# Patient Record
Sex: MALE | Race: WHITE | NOT HISPANIC OR LATINO | Employment: OTHER | URBAN - METROPOLITAN AREA
[De-identification: names, ages, dates, MRNs, and addresses within clinical notes are randomized per-mention and may not be internally consistent; named-entity substitution may affect disease eponyms.]

---

## 2023-04-30 DIAGNOSIS — I25.10 ATHEROSCLEROTIC HEART DISEASE OF NATIVE CORONARY ARTERY WITHOUT ANGINA PECTORIS: ICD-10-CM

## 2023-05-01 ENCOUNTER — OFFICE VISIT (OUTPATIENT)
Dept: PRIMARY CARE | Facility: CLINIC | Age: 78
End: 2023-05-01
Payer: MEDICARE

## 2023-05-01 VITALS
WEIGHT: 273 LBS | BODY MASS INDEX: 42.85 KG/M2 | DIASTOLIC BLOOD PRESSURE: 70 MMHG | HEIGHT: 67 IN | SYSTOLIC BLOOD PRESSURE: 140 MMHG

## 2023-05-01 DIAGNOSIS — I10 ESSENTIAL (PRIMARY) HYPERTENSION: ICD-10-CM

## 2023-05-01 DIAGNOSIS — G47.33 OBSTRUCTIVE SLEEP APNEA: ICD-10-CM

## 2023-05-01 DIAGNOSIS — E78.2 MIXED HYPERLIPIDEMIA: ICD-10-CM

## 2023-05-01 DIAGNOSIS — I25.10 CORONARY ARTERY DISEASE INVOLVING NATIVE HEART WITHOUT ANGINA PECTORIS, UNSPECIFIED VESSEL OR LESION TYPE: Primary | ICD-10-CM

## 2023-05-01 DIAGNOSIS — E87.1 HYPONATREMIA: ICD-10-CM

## 2023-05-01 DIAGNOSIS — I10 BENIGN ESSENTIAL HYPERTENSION: ICD-10-CM

## 2023-05-01 DIAGNOSIS — Z12.5 SCREENING FOR MALIGNANT NEOPLASM OF PROSTATE: ICD-10-CM

## 2023-05-01 DIAGNOSIS — E66.01 MORBID OBESITY (MULTI): ICD-10-CM

## 2023-05-01 PROBLEM — L82.1 SEBORRHEIC KERATOSIS: Status: ACTIVE | Noted: 2023-05-01

## 2023-05-01 PROBLEM — I65.29 CAROTID STENOSIS: Status: ACTIVE | Noted: 2023-05-01

## 2023-05-01 PROBLEM — M19.90 ARTHRITIS: Status: ACTIVE | Noted: 2023-05-01

## 2023-05-01 PROBLEM — E78.5 HYPERLIPIDEMIA: Status: ACTIVE | Noted: 2023-05-01

## 2023-05-01 PROBLEM — R73.9 ELEVATED BLOOD SUGAR: Status: ACTIVE | Noted: 2023-05-01

## 2023-05-01 PROBLEM — E78.00 PURE HYPERCHOLESTEROLEMIA: Status: ACTIVE | Noted: 2023-05-01

## 2023-05-01 PROBLEM — D12.6 TUBULAR ADENOMA OF COLON: Status: ACTIVE | Noted: 2023-05-01

## 2023-05-01 PROBLEM — R42 DIZZINESS: Status: ACTIVE | Noted: 2023-05-01

## 2023-05-01 PROBLEM — H61.21 HEARING LOSS OF RIGHT EAR DUE TO CERUMEN IMPACTION: Status: ACTIVE | Noted: 2023-05-01

## 2023-05-01 PROBLEM — M25.569 JOINT PAIN, KNEE: Status: ACTIVE | Noted: 2023-05-01

## 2023-05-01 PROBLEM — M25.562 KNEE PAIN, BILATERAL: Status: ACTIVE | Noted: 2023-05-01

## 2023-05-01 PROBLEM — R06.09 DYSPNEA ON EXERTION: Status: ACTIVE | Noted: 2023-05-01

## 2023-05-01 PROBLEM — G45.1 CAROTID ARTERY OCCLUSION SYNDROME: Status: ACTIVE | Noted: 2023-05-01

## 2023-05-01 PROBLEM — I35.1 MODERATE AORTIC REGURGITATION: Status: ACTIVE | Noted: 2023-05-01

## 2023-05-01 PROBLEM — N28.1 RENAL CYST: Status: ACTIVE | Noted: 2023-05-01

## 2023-05-01 PROBLEM — I71.40 AORTIC ANEURYSM, ABDOMINAL (CMS-HCC): Status: ACTIVE | Noted: 2023-05-01

## 2023-05-01 PROBLEM — H02.409 DROOPING EYELID: Status: ACTIVE | Noted: 2023-05-01

## 2023-05-01 PROBLEM — B35.1 NAIL FUNGUS: Status: ACTIVE | Noted: 2023-05-01

## 2023-05-01 PROBLEM — M54.9 BACKACHE: Status: ACTIVE | Noted: 2023-05-01

## 2023-05-01 PROBLEM — I37.1 PULMONARY VALVE REGURGITATION: Status: ACTIVE | Noted: 2023-05-01

## 2023-05-01 PROBLEM — M25.561 KNEE PAIN, BILATERAL: Status: ACTIVE | Noted: 2023-05-01

## 2023-05-01 PROCEDURE — 1159F MED LIST DOCD IN RCRD: CPT | Performed by: INTERNAL MEDICINE

## 2023-05-01 PROCEDURE — 99214 OFFICE O/P EST MOD 30 MIN: CPT | Performed by: INTERNAL MEDICINE

## 2023-05-01 PROCEDURE — 3077F SYST BP >= 140 MM HG: CPT | Performed by: INTERNAL MEDICINE

## 2023-05-01 PROCEDURE — 3078F DIAST BP <80 MM HG: CPT | Performed by: INTERNAL MEDICINE

## 2023-05-01 RX ORDER — LISINOPRIL 20 MG/1
20 TABLET ORAL DAILY
COMMUNITY
Start: 2022-12-13 | End: 2024-03-25 | Stop reason: SDUPTHER

## 2023-05-01 RX ORDER — AMLODIPINE BESYLATE 5 MG/1
1 TABLET ORAL DAILY
COMMUNITY
Start: 2023-02-15 | End: 2024-03-25 | Stop reason: SDUPTHER

## 2023-05-01 RX ORDER — ATORVASTATIN CALCIUM 40 MG/1
TABLET, FILM COATED ORAL
Qty: 90 TABLET | Refills: 0 | Status: SHIPPED | OUTPATIENT
Start: 2023-05-01 | End: 2023-10-09 | Stop reason: ALTCHOICE

## 2023-05-01 RX ORDER — ATORVASTATIN CALCIUM 40 MG/1
40 TABLET, FILM COATED ORAL DAILY
Qty: 90 TABLET | Refills: 1 | Status: SHIPPED | OUTPATIENT
Start: 2023-05-01 | End: 2023-08-02

## 2023-05-01 RX ORDER — ASPIRIN 81 MG/1
81 TABLET ORAL DAILY
COMMUNITY

## 2023-05-01 RX ORDER — METOPROLOL SUCCINATE 100 MG/1
1 TABLET, EXTENDED RELEASE ORAL DAILY
COMMUNITY
Start: 2017-09-13 | End: 2023-10-09 | Stop reason: ALTCHOICE

## 2023-05-01 RX ORDER — ATORVASTATIN CALCIUM 40 MG/1
1 TABLET, FILM COATED ORAL DAILY
COMMUNITY
Start: 2019-06-02 | End: 2023-05-01 | Stop reason: SDUPTHER

## 2023-05-01 NOTE — PROGRESS NOTES
"Subjective   Patient ID: Garrick Hackett is a 77 y.o. male who presents for Follow-up and Med Refill.    HPI   Patient here for follow-up  Did blood work  Needs medication refill  Using CPAP occasionally not very noncompliant  Drinking alcohol every day  Doing the chair volleyball but not much activity  Follow-up on hypertension high cholesterol coronary artery disease  Had colonoscopy found to have tubular adenoma due for colonoscopy in 3 years again  Not able to lose weight at all        past recap  Patient got CPAP machine  He is using every day for 5-6 hours  He only missed using it when he was at [facility] while on his way to [place]  IHe does feel that CPAP is helping his sleep and he is feeling less tired  Follow-up on blood work  Follow-up on blood pressure cholesterol and aneurysm Had a follow-up with cardiologist for his coronary artery disease  He was drinking quite a bit during holiday season  Has gained weight hyponatremia        past recap  Patient is here for follow-up and still not able to lose weight   Second stent not Placed not sure if he needs to continue on Plavix  Patient did the sleep study but did not get the CPAP machine records his insurance did not cover the machine  Follow-up on high cholesterol hypertension elevated blood sugar     Still drinking moderately 6 beers a day  Trying to watch diet  Review of Systems    Objective   /70   Ht 1.702 m (5' 7\")   Wt 124 kg (273 lb)   BMI 42.76 kg/m²     Physical Exam  Vitals reviewed.   Constitutional:       Appearance: Normal appearance. He is obese.   HENT:      Head: Normocephalic and atraumatic.      Right Ear: Tympanic membrane, ear canal and external ear normal.      Left Ear: Tympanic membrane, ear canal and external ear normal.      Nose: Nose normal.      Mouth/Throat:      Pharynx: Oropharynx is clear.   Eyes:      Extraocular Movements: Extraocular movements intact.      Conjunctiva/sclera: Conjunctivae normal.      Pupils: Pupils are " equal, round, and reactive to light.   Cardiovascular:      Rate and Rhythm: Normal rate and regular rhythm.      Pulses: Normal pulses.      Heart sounds: Normal heart sounds.   Pulmonary:      Effort: Pulmonary effort is normal.      Breath sounds: Normal breath sounds.   Abdominal:      General: Abdomen is flat. Bowel sounds are normal.      Palpations: Abdomen is soft.   Musculoskeletal:      Cervical back: Normal range of motion and neck supple.   Skin:     General: Skin is warm and dry.   Neurological:      General: No focal deficit present.      Mental Status: He is alert and oriented to person, place, and time.   Psychiatric:         Mood and Affect: Mood normal.         Assessment/Plan   Problem List Items Addressed This Visit          Nervous    Obstructive sleep apnea       Circulatory    Benign essential hypertension    CAD (coronary artery disease) - Primary    Relevant Medications    amLODIPine (Norvasc) 5 mg tablet    metoprolol succinate XL (Toprol-XL) 100 mg 24 hr tablet    Other Relevant Orders    CBC    Comprehensive Metabolic Panel    Lipid Panel    TSH with reflex to Free T4 if abnormal    Vitamin D 25-Hydroxy,Total       Endocrine/Metabolic    Morbid obesity (CMS/HCC)       Other    Hyperlipidemia    Relevant Medications    atorvastatin (Lipitor) 40 mg tablet    Hyponatremia     Other Visit Diagnoses       Screening for malignant neoplasm of prostate        Relevant Orders    Prostate Spec.Ag,Screen    Essential (primary) hypertension        Relevant Orders    TSH with reflex to Free T4 if abnormal    Body mass index (BMI) 40.0-44.9, adult (CMS/HCC)        Relevant Orders    Vitamin D 25-Hydroxy,Total             past recap  Medicare wellness exam done  Blood pressure stable  Colonoscopy results reviewed  Continue CPAP use for sleep apnea  Blood work results reviewed  Diabetes under control  Cholesterol under control  Blood pressure under control  COPD stable  Follow-up in 6 months  Again  encouraged to quit drinking lose weight exercise  Patient agreed to see the cardiologist and vascular surgeon  Reference given     12/13/22  Repeat blood pressure 130/70  Cholesterol is okay  Again emphasized compliance with CPAP  Follow-up blood work in 3 months as sodium is 130 the lowest he has  Discussed pathophysiology of low sodium and complications  He needs to cut down the drinking  Will also change his lisinopril from 20 mg to 40 mg and discontinue hydrochlorothiazide  Follow-up in 3 months    5/1/2023  Patient has not done blood work  Blood work ordered  Blood pressure stable  Patient had low sodium last time will monitor as patient is still drinking alcohol  Medications refilled  Again encouraged to use CPAP every day  Lose weight  Follow-up after blood work if abnormal otherwise 3 months follow-up

## 2023-08-02 DIAGNOSIS — E78.2 MIXED HYPERLIPIDEMIA: ICD-10-CM

## 2023-08-02 RX ORDER — ATORVASTATIN CALCIUM 40 MG/1
40 TABLET, FILM COATED ORAL DAILY
Qty: 90 TABLET | Refills: 0 | Status: SHIPPED | OUTPATIENT
Start: 2023-08-02 | End: 2024-02-11

## 2023-09-27 PROBLEM — Z79.891 LONG TERM (CURRENT) USE OF OPIATE ANALGESIC: Status: ACTIVE | Noted: 2023-02-13

## 2023-09-27 PROBLEM — I10 HYPERTENSION: Status: ACTIVE | Noted: 2023-09-27

## 2023-09-27 PROBLEM — I10 ESSENTIAL (PRIMARY) HYPERTENSION: Status: ACTIVE | Noted: 2023-02-13

## 2023-09-27 PROBLEM — Z98.61 HISTORY OF PERCUTANEOUS TRANSLUMINAL CORONARY ANGIOPLASTY: Status: ACTIVE | Noted: 2023-09-27

## 2023-09-27 PROBLEM — E66.01 MORBID (SEVERE) OBESITY DUE TO EXCESS CALORIES (MULTI): Status: ACTIVE | Noted: 2023-02-13

## 2023-09-27 PROBLEM — D12.6 COLON ADENOMA: Status: ACTIVE | Noted: 2023-09-27

## 2023-09-27 PROBLEM — Z87.891 PERSONAL HISTORY OF NICOTINE DEPENDENCE: Status: ACTIVE | Noted: 2023-02-13

## 2023-09-27 RX ORDER — LISINOPRIL 40 MG/1
TABLET ORAL DAILY
COMMUNITY
Start: 2023-01-30 | End: 2023-10-09 | Stop reason: ALTCHOICE

## 2023-09-27 RX ORDER — SILDENAFIL 50 MG/1
1 TABLET, FILM COATED ORAL AS NEEDED
COMMUNITY
Start: 2023-08-25

## 2023-09-27 RX ORDER — ACETAMINOPHEN 500 MG
2 TABLET ORAL AS NEEDED
COMMUNITY
Start: 2023-02-16 | End: 2023-10-09 | Stop reason: ALTCHOICE

## 2023-09-27 RX ORDER — METOPROLOL SUCCINATE 100 MG/1
TABLET, EXTENDED RELEASE ORAL DAILY
COMMUNITY
Start: 2023-01-30 | End: 2024-03-25 | Stop reason: SDUPTHER

## 2023-09-27 RX ORDER — TRIAMTERENE AND HYDROCHLOROTHIAZIDE 75; 50 MG/1; MG/1
1 TABLET ORAL DAILY
COMMUNITY
End: 2023-10-09 | Stop reason: ALTCHOICE

## 2023-09-27 RX ORDER — LISINOPRIL AND HYDROCHLOROTHIAZIDE 20; 25 MG/1; MG/1
1 TABLET ORAL DAILY
COMMUNITY
Start: 2022-11-01 | End: 2023-10-09 | Stop reason: ALTCHOICE

## 2023-09-27 RX ORDER — HYDROCODONE BITARTRATE AND ACETAMINOPHEN 5; 325 MG/1; MG/1
TABLET ORAL AS NEEDED
COMMUNITY
Start: 2023-02-14 | End: 2023-10-09 | Stop reason: ALTCHOICE

## 2023-09-27 RX ORDER — FERROUS SULFATE 325(65) MG
1 TABLET ORAL 3 TIMES WEEKLY
COMMUNITY
Start: 2023-02-16

## 2023-09-27 RX ORDER — CALCIUM CARBONATE/VITAMIN D3 600MG-5MCG
1 TABLET ORAL DAILY
COMMUNITY
End: 2023-10-09 | Stop reason: ALTCHOICE

## 2023-09-27 RX ORDER — ACETAMINOPHEN 500 MG
TABLET ORAL
COMMUNITY
End: 2023-10-09 | Stop reason: ALTCHOICE

## 2023-09-27 RX ORDER — SIMVASTATIN 20 MG/1
1 TABLET, FILM COATED ORAL DAILY
COMMUNITY
End: 2024-03-25 | Stop reason: ALTCHOICE

## 2023-09-27 RX ORDER — MULTIVIT-MIN/FA/LYCOPEN/LUTEIN .4-300-25
1 TABLET ORAL DAILY
COMMUNITY
Start: 2023-02-16

## 2023-09-27 RX ORDER — VIT C/E/ZN/COPPR/LUTEIN/ZEAXAN 250MG-90MG
2 CAPSULE ORAL DAILY
COMMUNITY
Start: 2023-02-16 | End: 2023-10-09 | Stop reason: ALTCHOICE

## 2023-09-29 ENCOUNTER — OFFICE VISIT (OUTPATIENT)
Dept: PRIMARY CARE | Facility: CLINIC | Age: 78
End: 2023-09-29
Payer: MEDICARE

## 2023-09-29 VITALS
HEIGHT: 67 IN | BODY MASS INDEX: 42.85 KG/M2 | WEIGHT: 273 LBS | TEMPERATURE: 98 F | SYSTOLIC BLOOD PRESSURE: 126 MMHG | DIASTOLIC BLOOD PRESSURE: 72 MMHG

## 2023-09-29 DIAGNOSIS — J32.9 SINUSITIS, UNSPECIFIED CHRONICITY, UNSPECIFIED LOCATION: Primary | ICD-10-CM

## 2023-09-29 DIAGNOSIS — J40 BRONCHITIS: ICD-10-CM

## 2023-09-29 PROCEDURE — 1036F TOBACCO NON-USER: CPT | Performed by: INTERNAL MEDICINE

## 2023-09-29 PROCEDURE — 1126F AMNT PAIN NOTED NONE PRSNT: CPT | Performed by: INTERNAL MEDICINE

## 2023-09-29 PROCEDURE — 3074F SYST BP LT 130 MM HG: CPT | Performed by: INTERNAL MEDICINE

## 2023-09-29 PROCEDURE — 99213 OFFICE O/P EST LOW 20 MIN: CPT | Performed by: INTERNAL MEDICINE

## 2023-09-29 PROCEDURE — 3078F DIAST BP <80 MM HG: CPT | Performed by: INTERNAL MEDICINE

## 2023-09-29 PROCEDURE — 1159F MED LIST DOCD IN RCRD: CPT | Performed by: INTERNAL MEDICINE

## 2023-09-29 RX ORDER — AMOXICILLIN AND CLAVULANATE POTASSIUM 875; 125 MG/1; MG/1
875 TABLET, FILM COATED ORAL 2 TIMES DAILY
Qty: 20 TABLET | Refills: 0 | Status: SHIPPED | OUTPATIENT
Start: 2023-09-29 | End: 2023-10-09 | Stop reason: ALTCHOICE

## 2023-09-29 RX ORDER — ALBUTEROL SULFATE 90 UG/1
2 AEROSOL, METERED RESPIRATORY (INHALATION) EVERY 4 HOURS PRN
Qty: 8.5 G | Refills: 0 | Status: SHIPPED | OUTPATIENT
Start: 2023-09-29 | End: 2024-09-28

## 2023-09-29 ASSESSMENT — ENCOUNTER SYMPTOMS
OCCASIONAL FEELINGS OF UNSTEADINESS: 0
DEPRESSION: 0
LOSS OF SENSATION IN FEET: 0

## 2023-09-29 NOTE — PROGRESS NOTES
"Subjective   Patient ID: Garrick Hackett is a 78 y.o. male who presents for Cough.    HPI   Patient is here with complaints of having sinus congestion postnasal drainage chest congestion cough for past 1 week.  Having fever  COVID-negative   Does not smoke       patient here for follow-up  Did blood work  Needs medication refill  Using CPAP occasionally not very noncompliant  Drinking alcohol every day  Doing the chair volleyball but not much activity  Follow-up on hypertension high cholesterol coronary artery disease  Had colonoscopy found to have tubular adenoma due for colonoscopy in 3 years again  Not able to lose weight at all     Patient got CPAP machine  He is using every day for 5-6 hours  He only missed using it when he was at [facility] while on his way to [place]  IHe does feel that CPAP is helping his sleep and he is feeling less tired  Follow-up on blood work  Follow-up on blood pressure cholesterol and aneurysm Had a follow-up with cardiologist for his coronary artery disease  He was drinking quite a bit during holiday season  Has gained weight hyponatremia     Patient is here for follow-up and still not able to lose weight   Second stent not Placed not sure if he needs to continue on Plavix  Patient did the sleep study but did not get the CPAP machine records his insurance did not cover the machine  Follow-up on high cholesterol hypertension elevated blood sugar     Still drinking moderately 6 beers a day  Trying to watch diet  Review of Systems    Objective   /72   Ht 1.702 m (5' 7\")   Wt 124 kg (273 lb)   BMI 42.76 kg/m²     Physical Exam  Vitals reviewed.   Constitutional:       Appearance: Normal appearance.   HENT:      Head: Normocephalic and atraumatic.      Right Ear: Tympanic membrane, ear canal and external ear normal.      Left Ear: Tympanic membrane, ear canal and external ear normal.      Nose: Rhinorrhea present. No congestion.      Mouth/Throat:      Pharynx: Oropharynx is clear. "   Eyes:      Extraocular Movements: Extraocular movements intact.      Conjunctiva/sclera: Conjunctivae normal.      Pupils: Pupils are equal, round, and reactive to light.   Cardiovascular:      Rate and Rhythm: Normal rate and regular rhythm.      Pulses: Normal pulses.      Heart sounds: Normal heart sounds.   Pulmonary:      Effort: Pulmonary effort is normal.      Breath sounds: Rhonchi present.   Abdominal:      General: Abdomen is flat. Bowel sounds are normal.      Palpations: Abdomen is soft.   Musculoskeletal:      Cervical back: Normal range of motion and neck supple.   Skin:     General: Skin is warm and dry.   Neurological:      General: No focal deficit present.      Mental Status: He is alert and oriented to person, place, and time.   Psychiatric:         Mood and Affect: Mood normal.         Assessment/Plan   Problem List Items Addressed This Visit    None  Visit Diagnoses         Codes    Sinusitis, unspecified chronicity, unspecified location    -  Primary J32.9    Relevant Medications    amoxicillin-pot clavulanate (Augmentin) 875-125 mg tablet    albuterol (ProAir HFA) 90 mcg/actuation inhaler    Bronchitis     J40    Relevant Medications    amoxicillin-pot clavulanate (Augmentin) 875-125 mg tablet    albuterol (ProAir HFA) 90 mcg/actuation inhaler         past recap  Medicare wellness exam done  Blood pressure stable  Colonoscopy results reviewed  Continue CPAP use for sleep apnea  Blood work results reviewed  Diabetes under control  Cholesterol under control  Blood pressure under control  COPD stable  Follow-up in 6 months  Again encouraged to quit drinking lose weight exercise  Patient agreed to see the cardiologist and vascular surgeon  Reference given     12/13/22  Repeat blood pressure 130/70  Cholesterol is okay  Again emphasized compliance with CPAP  Follow-up blood work in 3 months as sodium is 130 the lowest he has  Discussed pathophysiology of low sodium and complications  He needs to  cut down the drinking  Will also change his lisinopril from 20 mg to 40 mg and discontinue hydrochlorothiazide  Follow-up in 3 months     5/1/2023  Patient has not done blood work  Blood work ordered  Blood pressure stable  Patient had low sodium last time will monitor as patient is still drinking alcohol  Medications refilled  Again encouraged to use CPAP every day  Lose weight  Follow-up after blood work if abnormal otherwise 3 months follow-up     9/29/2023  Temperature 98  Treat with Zithromax and albuterol inhaler  Rest fluids over-the-counter decongestant  Follow-up if not better

## 2023-10-02 ENCOUNTER — APPOINTMENT (OUTPATIENT)
Dept: UROLOGY | Facility: CLINIC | Age: 78
End: 2023-10-02
Payer: MEDICARE

## 2023-10-03 ENCOUNTER — LAB (OUTPATIENT)
Dept: LAB | Facility: LAB | Age: 78
End: 2023-10-03
Payer: MEDICARE

## 2023-10-03 DIAGNOSIS — I25.10 CORONARY ARTERY DISEASE INVOLVING NATIVE HEART WITHOUT ANGINA PECTORIS, UNSPECIFIED VESSEL OR LESION TYPE: ICD-10-CM

## 2023-10-03 DIAGNOSIS — Z12.5 SCREENING FOR MALIGNANT NEOPLASM OF PROSTATE: ICD-10-CM

## 2023-10-03 DIAGNOSIS — I10 ESSENTIAL (PRIMARY) HYPERTENSION: ICD-10-CM

## 2023-10-03 LAB
25(OH)D3 SERPL-MCNC: 22 NG/ML (ref 30–100)
ALBUMIN SERPL BCP-MCNC: 4.1 G/DL (ref 3.4–5)
ALP SERPL-CCNC: 104 U/L (ref 33–136)
ALT SERPL W P-5'-P-CCNC: 18 U/L (ref 10–52)
ANION GAP SERPL CALC-SCNC: 17 MMOL/L (ref 10–20)
AST SERPL W P-5'-P-CCNC: 16 U/L (ref 9–39)
BILIRUB SERPL-MCNC: 1 MG/DL (ref 0–1.2)
BUN SERPL-MCNC: 15 MG/DL (ref 6–23)
CALCIUM SERPL-MCNC: 9.5 MG/DL (ref 8.6–10.6)
CHLORIDE SERPL-SCNC: 100 MMOL/L (ref 98–107)
CHOLEST SERPL-MCNC: 153 MG/DL (ref 0–199)
CHOLESTEROL/HDL RATIO: 2.5
CO2 SERPL-SCNC: 25 MMOL/L (ref 21–32)
CREAT SERPL-MCNC: 0.81 MG/DL (ref 0.5–1.3)
ERYTHROCYTE [DISTWIDTH] IN BLOOD BY AUTOMATED COUNT: 13.1 % (ref 11.5–14.5)
GFR SERPL CREATININE-BSD FRML MDRD: >90 ML/MIN/1.73M*2
GLUCOSE SERPL-MCNC: 104 MG/DL (ref 74–99)
HCT VFR BLD AUTO: 43.7 % (ref 41–52)
HDLC SERPL-MCNC: 61 MG/DL
HGB BLD-MCNC: 14.7 G/DL (ref 13.5–17.5)
LDLC SERPL CALC-MCNC: 67 MG/DL (ref 140–190)
MCH RBC QN AUTO: 31.5 PG (ref 26–34)
MCHC RBC AUTO-ENTMCNC: 33.6 G/DL (ref 32–36)
MCV RBC AUTO: 94 FL (ref 80–100)
NON HDL CHOLESTEROL: 92 MG/DL (ref 0–149)
NRBC BLD-RTO: 0 /100 WBCS (ref 0–0)
PLATELET # BLD AUTO: 199 X10*3/UL (ref 150–450)
PMV BLD AUTO: 9.4 FL (ref 7.5–11.5)
POTASSIUM SERPL-SCNC: 4.7 MMOL/L (ref 3.5–5.3)
PROT SERPL-MCNC: 6.8 G/DL (ref 6.4–8.2)
PSA SERPL-MCNC: 0.75 NG/ML
RBC # BLD AUTO: 4.67 X10*6/UL (ref 4.5–5.9)
SODIUM SERPL-SCNC: 137 MMOL/L (ref 136–145)
TRIGL SERPL-MCNC: 125 MG/DL (ref 0–149)
TSH SERPL-ACNC: 1.34 MIU/L (ref 0.44–3.98)
VLDL: 25 MG/DL (ref 0–40)
WBC # BLD AUTO: 8.2 X10*3/UL (ref 4.4–11.3)

## 2023-10-03 PROCEDURE — 36415 COLL VENOUS BLD VENIPUNCTURE: CPT

## 2023-10-09 ENCOUNTER — OFFICE VISIT (OUTPATIENT)
Dept: PRIMARY CARE | Facility: CLINIC | Age: 78
End: 2023-10-09
Payer: MEDICARE

## 2023-10-09 VITALS
DIASTOLIC BLOOD PRESSURE: 76 MMHG | WEIGHT: 271 LBS | BODY MASS INDEX: 42.53 KG/M2 | SYSTOLIC BLOOD PRESSURE: 146 MMHG | HEIGHT: 67 IN

## 2023-10-09 DIAGNOSIS — I10 BENIGN ESSENTIAL HYPERTENSION: Primary | ICD-10-CM

## 2023-10-09 DIAGNOSIS — R20.2 NUMBNESS AND TINGLING OF BOTH LEGS: ICD-10-CM

## 2023-10-09 DIAGNOSIS — E78.5 DYSLIPIDEMIA: ICD-10-CM

## 2023-10-09 DIAGNOSIS — I25.84 CORONARY ARTERY DISEASE DUE TO CALCIFIED CORONARY LESION: ICD-10-CM

## 2023-10-09 DIAGNOSIS — I25.10 CORONARY ARTERY DISEASE DUE TO CALCIFIED CORONARY LESION: ICD-10-CM

## 2023-10-09 DIAGNOSIS — E66.01 MORBID (SEVERE) OBESITY DUE TO EXCESS CALORIES (MULTI): ICD-10-CM

## 2023-10-09 DIAGNOSIS — R20.0 NUMBNESS AND TINGLING OF BOTH LEGS: ICD-10-CM

## 2023-10-09 PROCEDURE — 1159F MED LIST DOCD IN RCRD: CPT | Performed by: INTERNAL MEDICINE

## 2023-10-09 PROCEDURE — 1126F AMNT PAIN NOTED NONE PRSNT: CPT | Performed by: INTERNAL MEDICINE

## 2023-10-09 PROCEDURE — 99214 OFFICE O/P EST MOD 30 MIN: CPT | Performed by: INTERNAL MEDICINE

## 2023-10-09 PROCEDURE — 1036F TOBACCO NON-USER: CPT | Performed by: INTERNAL MEDICINE

## 2023-10-09 PROCEDURE — 3078F DIAST BP <80 MM HG: CPT | Performed by: INTERNAL MEDICINE

## 2023-10-09 PROCEDURE — 3077F SYST BP >= 140 MM HG: CPT | Performed by: INTERNAL MEDICINE

## 2023-10-09 NOTE — PROGRESS NOTES
"Subjective   Patient ID: Garrick Hackett is a 78 y.o. male who presents for Follow-up.    HPI   Patient is here for follow-up  Complaining of numbness and tingling in both feet  Went to see the cardiologist separation the leg was fine  Not able to make appointment with the vascular yet  Follow-up on hypertension high cholesterol coronary artery disease    patient here for follow-up  Did blood work  Needs medication refill  Using CPAP occasionally not very noncompliant  Drinking alcohol every day  Doing the chair volleyball but not much activity  Follow-up on hypertension high cholesterol coronary artery disease  Had colonoscopy found to have tubular adenoma due for colonoscopy in 3 years again  Not able to lose weight at all     Patient got CPAP machine  He is using every day for 5-6 hours  He only missed using it when he was at [facility] while on his way to [place]  IHe does feel that CPAP is helping his sleep and he is feeling less tired  Follow-up on blood work  Follow-up on blood pressure cholesterol and aneurysm Had a follow-up with cardiologist for his coronary artery disease  He was drinking quite a bit during holiday season  Has gained weight hyponatremia     Patient is here for follow-up and still not able to lose weight   Second stent not Placed not sure if he needs to continue on Plavix  Patient did the sleep study but did not get the CPAP machine records his insurance did not cover the machine  Follow-up on high cholesterol hypertension elevated blood sugar     Still drinking moderately 6 beers a day  Trying to watch diet  Review of Systems    Objective   /76   Ht 1.702 m (5' 7\")   Wt 123 kg (271 lb)   BMI 42.44 kg/m²     Physical Exam  Vitals reviewed.   Constitutional:       Appearance: Normal appearance. He is obese.   HENT:      Head: Normocephalic and atraumatic.      Right Ear: Tympanic membrane, ear canal and external ear normal.      Left Ear: Tympanic membrane, ear canal and external ear " normal.      Nose: Nose normal. No congestion.      Mouth/Throat:      Pharynx: Oropharynx is clear.   Eyes:      Extraocular Movements: Extraocular movements intact.      Conjunctiva/sclera: Conjunctivae normal.      Pupils: Pupils are equal, round, and reactive to light.   Cardiovascular:      Rate and Rhythm: Normal rate and regular rhythm.      Pulses: Normal pulses.      Heart sounds: Normal heart sounds.   Pulmonary:      Effort: Pulmonary effort is normal.      Breath sounds: Normal breath sounds.   Abdominal:      General: Abdomen is flat. Bowel sounds are normal.      Palpations: Abdomen is soft.   Musculoskeletal:      Cervical back: Normal range of motion and neck supple.   Skin:     General: Skin is warm and dry.   Neurological:      General: No focal deficit present.      Mental Status: He is alert and oriented to person, place, and time.   Psychiatric:         Mood and Affect: Mood normal.         Assessment/Plan   Problem List Items Addressed This Visit             ICD-10-CM       Cardiac and Vasculature    Benign essential hypertension - Primary I10    CAD (coronary artery disease) I25.10    Dyslipidemia E78.5       Endocrine/Metabolic    Morbid (severe) obesity due to excess calories (CMS/[facility]) E66.01     Other Visit Diagnoses         Codes    Numbness and tingling of both legs     R20.0, R20.2    Relevant Orders    EMG & nerve conduction         past recap  Medicare wellness exam done  Blood pressure stable  Colonoscopy results reviewed  Continue CPAP use for sleep apnea  Blood work results reviewed  Diabetes under control  Cholesterol under control  Blood pressure under control  COPD stable  Follow-up in 6 months  Again encouraged to quit drinking lose weight exercise  Patient agreed to see the cardiologist and vascular surgeon  Reference given     12/13/22  Repeat blood pressure 130/70  Cholesterol is okay  Again emphasized compliance with CPAP  Follow-up blood work in 3 months as sodium is 130  the lowest he has  Discussed pathophysiology of low sodium and complications  He needs to cut down the drinking  Will also change his lisinopril from 20 mg to 40 mg and discontinue hydrochlorothiazide  Follow-up in 3 months     5/1/2023  Patient has not done blood work  Blood work ordered  Blood pressure stable  Patient had low sodium last time will monitor as patient is still drinking alcohol  Medications refilled  Again encouraged to use CPAP every day  Lose weight  Follow-up after blood work if abnormal otherwise 3 months follow-up     9/29/2023  Temperature 98  Treat with Zithromax and albuterol inhaler  Rest fluids over-the-counter decongestant  Follow-up if not better    10/9/2023  Numbness and tingling in the feet probably not related to vascular  Pulses are palpable  Most likely neuropathy  We will check EMG test on both legs  We will check vitamin D  Blood pressures stable  Blood work reviewed everything in range  PSA is normal   Vitamin D level low take supplements

## 2023-10-10 DIAGNOSIS — I65.23 BILATERAL CAROTID ARTERY STENOSIS: Primary | ICD-10-CM

## 2023-10-10 DIAGNOSIS — Z95.828 H/O ENDOVASCULAR STENT GRAFT FOR ABDOMINAL AORTIC ANEURYSM: ICD-10-CM

## 2023-10-17 ENCOUNTER — ANCILLARY PROCEDURE (OUTPATIENT)
Dept: NEUROLOGY | Facility: CLINIC | Age: 78
End: 2023-10-17
Payer: MEDICARE

## 2023-10-17 DIAGNOSIS — M79.604 PAIN OF RIGHT LOWER EXTREMITY: ICD-10-CM

## 2023-10-17 DIAGNOSIS — R20.0 NUMBNESS: ICD-10-CM

## 2023-10-17 DIAGNOSIS — R20.0 NUMBNESS AND TINGLING OF BOTH LEGS: ICD-10-CM

## 2023-10-17 DIAGNOSIS — R20.2 NUMBNESS AND TINGLING OF BOTH LEGS: ICD-10-CM

## 2023-10-17 DIAGNOSIS — M79.605 PAIN OF LEFT LOWER EXTREMITY: Primary | ICD-10-CM

## 2023-10-17 PROCEDURE — 95886 MUSC TEST DONE W/N TEST COMP: CPT | Performed by: PSYCHIATRY & NEUROLOGY

## 2023-10-17 PROCEDURE — 95912 NRV CNDJ TEST 11-12 STUDIES: CPT | Performed by: PSYCHIATRY & NEUROLOGY

## 2023-10-17 NOTE — PROGRESS NOTES
Nerve conduction studies and needle EMG was performed today on this patient.  Full scanned report is available in the Media tab in Epic.    Impression:  Nerve conduction study and needle examination of the bilateral lower extremities were performed, see details in table. The results were abnormal because of the followin. Absent motor responses. Absent sensory responses. Absent H reflexes. Needle exam showing denervation in distal muscles. 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        Wily Schwarz MD

## 2023-10-25 ENCOUNTER — APPOINTMENT (OUTPATIENT)
Dept: PRIMARY CARE | Facility: CLINIC | Age: 78
End: 2023-10-25
Payer: MEDICARE

## 2023-10-25 ENCOUNTER — OFFICE VISIT (OUTPATIENT)
Dept: PRIMARY CARE | Facility: CLINIC | Age: 78
End: 2023-10-25
Payer: MEDICARE

## 2023-10-25 VITALS
HEIGHT: 67 IN | BODY MASS INDEX: 42.53 KG/M2 | DIASTOLIC BLOOD PRESSURE: 70 MMHG | SYSTOLIC BLOOD PRESSURE: 142 MMHG | WEIGHT: 271 LBS

## 2023-10-25 DIAGNOSIS — I25.10 CORONARY ARTERY DISEASE INVOLVING NATIVE HEART WITHOUT ANGINA PECTORIS, UNSPECIFIED VESSEL OR LESION TYPE: ICD-10-CM

## 2023-10-25 DIAGNOSIS — I10 BENIGN ESSENTIAL HYPERTENSION: ICD-10-CM

## 2023-10-25 DIAGNOSIS — Z01.818 PREOPERATIVE CLEARANCE: ICD-10-CM

## 2023-10-25 DIAGNOSIS — H57.813 PTOSIS OF BOTH EYEBROWS: Primary | ICD-10-CM

## 2023-10-25 PROBLEM — Z95.828 HISTORY OF REPAIR OF ANEURYSM OF ABDOMINAL AORTA USING ENDOVASCULAR STENT GRAFT: Status: ACTIVE | Noted: 2023-10-25

## 2023-10-25 PROCEDURE — 1126F AMNT PAIN NOTED NONE PRSNT: CPT | Performed by: INTERNAL MEDICINE

## 2023-10-25 PROCEDURE — 3078F DIAST BP <80 MM HG: CPT | Performed by: INTERNAL MEDICINE

## 2023-10-25 PROCEDURE — 3077F SYST BP >= 140 MM HG: CPT | Performed by: INTERNAL MEDICINE

## 2023-10-25 PROCEDURE — 93000 ELECTROCARDIOGRAM COMPLETE: CPT | Performed by: INTERNAL MEDICINE

## 2023-10-25 PROCEDURE — 99214 OFFICE O/P EST MOD 30 MIN: CPT | Performed by: INTERNAL MEDICINE

## 2023-10-25 PROCEDURE — 1159F MED LIST DOCD IN RCRD: CPT | Performed by: INTERNAL MEDICINE

## 2023-10-25 PROCEDURE — 1036F TOBACCO NON-USER: CPT | Performed by: INTERNAL MEDICINE

## 2023-10-31 NOTE — PROGRESS NOTES
"Subjective   Patient ID: Garrick Hackett is a 78 y.o. male who presents for presurgical clearance.    HPI   Patient is here for preop clearance.  Going for bilateral blepharoplasty for ptosis in both eyelids  Patient still drinks 4 drinks per day  He does have severe motor or sensory quality neuropathy on the EMG  No chest pain no shortness of breath.  Recent cardiac follow-up stable    Past recap   patient is here for follow-up  Complaining of numbness and tingling in both feet  Went to see the cardiologist separation the leg was fine  Not able to make appointment with the vascular yet  Follow-up on hypertension high cholesterol coronary artery disease    patient here for follow-up  Did blood work  Needs medication refill  Using CPAP occasionally not very noncompliant  Drinking alcohol every day  Doing the chair volleyball but not much activity  Follow-up on hypertension high cholesterol coronary artery disease  Had colonoscopy found to have tubular adenoma due for colonoscopy in 3 years again  Not able to lose weight at all     Patient got CPAP machine  He is using every day for 5-6 hours  He only missed using it when he was at [facility] while on his way to [place]  IHe does feel that CPAP is helping his sleep and he is feeling less tired  Follow-up on blood work  Follow-up on blood pressure cholesterol and aneurysm Had a follow-up with cardiologist for his coronary artery disease  He was drinking quite a bit during holiday season  Has gained weight hyponatremia     Patient is here for follow-up and still not able to lose weight   Second stent not Placed not sure if he needs to continue on Plavix  Patient did the sleep study but did not get the CPAP machine records his insurance did not cover the machine  Follow-up on high cholesterol hypertension elevated blood sugar     Still drinking moderately 6 beers a day  Trying to watch diet  Review of Systems    Objective   /70   Ht 1.702 m (5' 7\")   Wt 123 kg (271 lb) "   BMI 42.44 kg/m²     Physical Exam  Vitals reviewed.   Constitutional:       Appearance: Normal appearance. He is obese.   HENT:      Head: Normocephalic and atraumatic.      Right Ear: Tympanic membrane, ear canal and external ear normal.      Left Ear: Tympanic membrane, ear canal and external ear normal.      Nose: Nose normal. No congestion.      Mouth/Throat:      Pharynx: Oropharynx is clear.   Eyes:      Extraocular Movements: Extraocular movements intact.      Conjunctiva/sclera: Conjunctivae normal.      Pupils: Pupils are equal, round, and reactive to light.   Cardiovascular:      Rate and Rhythm: Normal rate and regular rhythm.      Pulses: Normal pulses.      Heart sounds: Normal heart sounds.   Pulmonary:      Effort: Pulmonary effort is normal.      Breath sounds: Normal breath sounds.   Abdominal:      General: Abdomen is flat. Bowel sounds are normal.      Palpations: Abdomen is soft.   Musculoskeletal:      Cervical back: Normal range of motion and neck supple.   Skin:     General: Skin is warm and dry.   Neurological:      General: No focal deficit present.      Mental Status: He is alert and oriented to person, place, and time.   Psychiatric:         Mood and Affect: Mood normal.         Assessment/Plan   Problem List Items Addressed This Visit             ICD-10-CM       Cardiac and Vasculature    Benign essential hypertension I10    CAD (coronary artery disease) I25.10     Other Visit Diagnoses         Codes    Ptosis of both eyebrows    -  Primary H57.813    Preoperative clearance     Z01.818    Relevant Orders    ECG 12 Lead         past recap  Medicare wellness exam done  Blood pressure stable  Colonoscopy results reviewed  Continue CPAP use for sleep apnea  Blood work results reviewed  Diabetes under control  Cholesterol under control  Blood pressure under control  COPD stable  Follow-up in 6 months  Again encouraged to quit drinking lose weight exercise  Patient agreed to see the  cardiologist and vascular surgeon  Reference given     12/13/22  Repeat blood pressure 130/70  Cholesterol is okay  Again emphasized compliance with CPAP  Follow-up blood work in 3 months as sodium is 130 the lowest he has  Discussed pathophysiology of low sodium and complications  He needs to cut down the drinking  Will also change his lisinopril from 20 mg to 40 mg and discontinue hydrochlorothiazide  Follow-up in 3 months     5/1/2023  Patient has not done blood work  Blood work ordered  Blood pressure stable  Patient had low sodium last time will monitor as patient is still drinking alcohol  Medications refilled  Again encouraged to use CPAP every day  Lose weight  Follow-up after blood work if abnormal otherwise 3 months follow-up         10/9/2023  Numbness and tingling in the feet probably not related to vascular  Pulses are palpable  Most likely neuropathy  We will check EMG test on both legs  We will check vitamin D  Blood pressures stable  Blood work reviewed everything in range  PSA is normal   Vitamin D level low take supplements    10/25/2023  EKG done shows normal sinus rhythm  Blood pressure is stable  Patient clinically stable for low impact surgery  Encourage patient to not drink

## 2023-12-13 ENCOUNTER — OFFICE VISIT (OUTPATIENT)
Dept: PRIMARY CARE | Facility: CLINIC | Age: 78
End: 2023-12-13
Payer: MEDICARE

## 2023-12-13 VITALS
WEIGHT: 272 LBS | BODY MASS INDEX: 42.69 KG/M2 | HEIGHT: 67 IN | SYSTOLIC BLOOD PRESSURE: 164 MMHG | DIASTOLIC BLOOD PRESSURE: 62 MMHG

## 2023-12-13 DIAGNOSIS — R73.9 ELEVATED BLOOD SUGAR: ICD-10-CM

## 2023-12-13 DIAGNOSIS — M54.9 BACK PAIN, UNSPECIFIED BACK LOCATION, UNSPECIFIED BACK PAIN LATERALITY, UNSPECIFIED CHRONICITY: ICD-10-CM

## 2023-12-13 DIAGNOSIS — I10 BENIGN ESSENTIAL HYPERTENSION: ICD-10-CM

## 2023-12-13 DIAGNOSIS — G60.8 PERIPHERAL SENSORY-MOTOR AXONAL POLYNEUROPATHY: ICD-10-CM

## 2023-12-13 DIAGNOSIS — M19.90 ARTHRITIS: ICD-10-CM

## 2023-12-13 DIAGNOSIS — E78.2 MIXED HYPERLIPIDEMIA: ICD-10-CM

## 2023-12-13 PROCEDURE — 3078F DIAST BP <80 MM HG: CPT | Performed by: INTERNAL MEDICINE

## 2023-12-13 PROCEDURE — 99214 OFFICE O/P EST MOD 30 MIN: CPT | Performed by: INTERNAL MEDICINE

## 2023-12-13 PROCEDURE — 3077F SYST BP >= 140 MM HG: CPT | Performed by: INTERNAL MEDICINE

## 2023-12-13 PROCEDURE — 1159F MED LIST DOCD IN RCRD: CPT | Performed by: INTERNAL MEDICINE

## 2023-12-13 PROCEDURE — 1036F TOBACCO NON-USER: CPT | Performed by: INTERNAL MEDICINE

## 2023-12-13 PROCEDURE — 1126F AMNT PAIN NOTED NONE PRSNT: CPT | Performed by: INTERNAL MEDICINE

## 2023-12-13 NOTE — PROGRESS NOTES
Subjective   Patient ID: Garrick Hackett is a 78 y.o. male who presents for Follow-up.    HPI   Patient is here for follow-up  Wants handicap placard  EMG of both legs done  Follow-up on hypertension high cholesterol high blood sugar  Continues to drink alcohol on regular basis     Past recap   patient is here for preop clearance.  Going for bilateral blepharoplasty for ptosis in both eyelids  Patient still drinks 4 drinks per day  He does have severe motor or sensory quality neuropathy on the EMG  No chest pain no shortness of breath.  Recent cardiac follow-up stable     patient is here for follow-up  Complaining of numbness and tingling in both feet  Went to see the cardiologist separation the leg was fine  Not able to make appointment with the vascular yet  Follow-up on hypertension high cholesterol coronary artery disease    patient here for follow-up  Did blood work  Needs medication refill  Using CPAP occasionally not very noncompliant  Drinking alcohol every day  Doing the chair volleyball but not much activity  Follow-up on hypertension high cholesterol coronary artery disease  Had colonoscopy found to have tubular adenoma due for colonoscopy in 3 years again  Not able to lose weight at all     Patient got CPAP machine  He is using every day for 5-6 hours  He only missed using it when he was at [facility] while on his way to [place]  IHe does feel that CPAP is helping his sleep and he is feeling less tired  Follow-up on blood work  Follow-up on blood pressure cholesterol and aneurysm Had a follow-up with cardiologist for his coronary artery disease  He was drinking quite a bit during holiday season  Has gained weight hyponatremia     Patient is here for follow-up and still not able to lose weight   Second stent not Placed not sure if he needs to continue on Plavix  Patient did the sleep study but did not get the CPAP machine records his insurance did not cover the machine  Follow-up on high cholesterol  "hypertension elevated blood sugar     Still drinking moderately 6 beers a day  Trying to watch diet  Review of Systems    Objective   /62   Ht 1.702 m (5' 7\")   Wt 123 kg (272 lb)   BMI 42.60 kg/m²     Physical Exam  Vitals reviewed.   Constitutional:       Appearance: Normal appearance. He is obese.   HENT:      Head: Normocephalic and atraumatic.      Right Ear: Tympanic membrane, ear canal and external ear normal.      Left Ear: Tympanic membrane, ear canal and external ear normal.      Nose: Nose normal. No congestion.      Mouth/Throat:      Pharynx: Oropharynx is clear.   Eyes:      Extraocular Movements: Extraocular movements intact.      Conjunctiva/sclera: Conjunctivae normal.      Pupils: Pupils are equal, round, and reactive to light.   Cardiovascular:      Rate and Rhythm: Normal rate and regular rhythm.      Pulses: Normal pulses.      Heart sounds: Normal heart sounds.   Pulmonary:      Effort: Pulmonary effort is normal.      Breath sounds: Normal breath sounds.   Abdominal:      General: Abdomen is flat. Bowel sounds are normal.      Palpations: Abdomen is soft.   Musculoskeletal:      Cervical back: Normal range of motion and neck supple.   Skin:     General: Skin is warm and dry.   Neurological:      General: No focal deficit present.      Mental Status: He is alert and oriented to person, place, and time.   Psychiatric:         Mood and Affect: Mood normal.         Assessment/Plan   Problem List Items Addressed This Visit             ICD-10-CM       Cardiac and Vasculature    Benign essential hypertension I10    Relevant Orders    CBC    Comprehensive Metabolic Panel    Lipid Panel    Thyroid Stimulating Hormone    Hemoglobin A1C    Vitamin B12    Hyperlipidemia E78.5    Relevant Orders    CBC    Comprehensive Metabolic Panel    Lipid Panel    Thyroid Stimulating Hormone    Hemoglobin A1C    Vitamin B12       Endocrine/Metabolic    Elevated blood sugar R73.9    Relevant Orders    CBC    " Comprehensive Metabolic Panel    Lipid Panel    Thyroid Stimulating Hormone    Hemoglobin A1C    Vitamin B12       Musculoskeletal and Injuries    Arthritis M19.90    Relevant Orders    Disability Placard    Backache M54.9    Relevant Orders    Disability Placard     Other Visit Diagnoses         Codes    Peripheral sensory-motor axonal polyneuropathy     G60.8    Relevant Orders    Disability Placard         past recap  Medicare wellness exam done  Blood pressure stable  Colonoscopy results reviewed  Continue CPAP use for sleep apnea  Blood work results reviewed  Diabetes under control  Cholesterol under control  Blood pressure under control  COPD stable  Follow-up in 6 months  Again encouraged to quit drinking lose weight exercise  Patient agreed to see the cardiologist and vascular surgeon  Reference given     12/13/22  Repeat blood pressure 130/70  Cholesterol is okay  Again emphasized compliance with CPAP  Follow-up blood work in 3 months as sodium is 130 the lowest he has  Discussed pathophysiology of low sodium and complications  He needs to cut down the drinking  Will also change his lisinopril from 20 mg to 40 mg and discontinue hydrochlorothiazide  Follow-up in 3 months     5/1/2023  Patient has not done blood work  Blood work ordered  Blood pressure stable  Patient had low sodium last time will monitor as patient is still drinking alcohol  Medications refilled  Again encouraged to use CPAP every day  Lose weight  Follow-up after blood work if abnormal otherwise 3 months follow-up         10/9/2023  Numbness and tingling in the feet probably not related to vascular  Pulses are palpable  Most likely neuropathy  We will check EMG test on both legs  We will check vitamin D  Blood pressures stable  Blood work reviewed everything in range  PSA is normal   Vitamin D level low take supplements    10/25/2023  EKG done shows normal sinus rhythm  Blood pressure is stable  Patient clinically stable for low impact  surgery  Encourage patient to not drink    12/13/2023  EMG test shows axonal sensory motor see 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  Most likely related to alcohol  Again encourage patient to quit drinking  Take vitamin B12  Handicap sticker given  Blood pressure stable  Cholesterol okay  Due for blood work  Medications refilled  Follow-up in 3 months

## 2024-01-12 ENCOUNTER — OFFICE VISIT (OUTPATIENT)
Dept: CARDIOLOGY | Facility: CLINIC | Age: 79
End: 2024-01-12
Payer: MEDICARE

## 2024-01-12 VITALS
HEART RATE: 96 BPM | SYSTOLIC BLOOD PRESSURE: 122 MMHG | WEIGHT: 271.2 LBS | BODY MASS INDEX: 42.56 KG/M2 | OXYGEN SATURATION: 94 % | HEIGHT: 67 IN | DIASTOLIC BLOOD PRESSURE: 78 MMHG

## 2024-01-12 DIAGNOSIS — I71.40 ABDOMINAL AORTIC ANEURYSM (AAA) WITHOUT RUPTURE, UNSPECIFIED PART (CMS-HCC): Primary | ICD-10-CM

## 2024-01-12 PROCEDURE — 1036F TOBACCO NON-USER: CPT | Performed by: NURSE PRACTITIONER

## 2024-01-12 PROCEDURE — 99214 OFFICE O/P EST MOD 30 MIN: CPT | Performed by: NURSE PRACTITIONER

## 2024-01-12 PROCEDURE — 3074F SYST BP LT 130 MM HG: CPT | Performed by: NURSE PRACTITIONER

## 2024-01-12 PROCEDURE — 1159F MED LIST DOCD IN RCRD: CPT | Performed by: NURSE PRACTITIONER

## 2024-01-12 PROCEDURE — 3078F DIAST BP <80 MM HG: CPT | Performed by: NURSE PRACTITIONER

## 2024-01-12 PROCEDURE — 1126F AMNT PAIN NOTED NONE PRSNT: CPT | Performed by: NURSE PRACTITIONER

## 2024-01-12 ASSESSMENT — ENCOUNTER SYMPTOMS
MUSCULOSKELETAL NEGATIVE: 1
CARDIOVASCULAR NEGATIVE: 1
NEUROLOGICAL NEGATIVE: 1
GASTROINTESTINAL NEGATIVE: 1
CONSTITUTIONAL NEGATIVE: 1
RESPIRATORY NEGATIVE: 1

## 2024-01-12 ASSESSMENT — COLUMBIA-SUICIDE SEVERITY RATING SCALE - C-SSRS
6. HAVE YOU EVER DONE ANYTHING, STARTED TO DO ANYTHING, OR PREPARED TO DO ANYTHING TO END YOUR LIFE?: NO
1. IN THE PAST MONTH, HAVE YOU WISHED YOU WERE DEAD OR WISHED YOU COULD GO TO SLEEP AND NOT WAKE UP?: NO
2. HAVE YOU ACTUALLY HAD ANY THOUGHTS OF KILLING YOURSELF?: NO

## 2024-01-12 ASSESSMENT — PATIENT HEALTH QUESTIONNAIRE - PHQ9
SUM OF ALL RESPONSES TO PHQ9 QUESTIONS 1 AND 2: 0
2. FEELING DOWN, DEPRESSED OR HOPELESS: NOT AT ALL
1. LITTLE INTEREST OR PLEASURE IN DOING THINGS: NOT AT ALL

## 2024-01-12 ASSESSMENT — PAIN SCALES - GENERAL: PAINLEVEL: 0-NO PAIN

## 2024-01-12 ASSESSMENT — LIFESTYLE VARIABLES: HOW OFTEN DO YOU HAVE A DRINK CONTAINING ALCOHOL: 2-3 TIMES A WEEK

## 2024-01-12 NOTE — PROGRESS NOTES
"Chief Complaint:   Follow-up    History Of Present Illness:    .Mr Hackett returns in follow up.  Denies chest pain, sob, palpitations or pedal edema.           Last Recorded Vitals:  Blood pressure 122/78, pulse 96, height 1.702 m (5' 7\"), weight 123 kg (271 lb 3.2 oz), SpO2 94 %.     Past Medical History:  Past Medical History:   Diagnosis Date    Personal history of other endocrine, nutritional and metabolic disease 06/12/2020    History of morbid obesity        Past Surgical History:  Past Surgical History:   Procedure Laterality Date    CT ABDOMEN PELVIS ANGIOGRAM W AND/OR WO IV CONTRAST  1/16/2018    CT ABDOMEN PELVIS ANGIOGRAM W AND/OR WO IV CONTRAST 1/16/2018 [facility]    CT ABDOMEN PELVIS ANGIOGRAM W AND/OR WO IV CONTRAST  6/6/2022    CT ABDOMEN PELVIS ANGIOGRAM W AND/OR WO IV CONTRAST 6/6/2022 [facility]    CT ABDOMEN PELVIS ANGIOGRAM W AND/OR WO IV CONTRAST  10/10/2017    CT ABDOMEN PELVIS ANGIOGRAM W AND/OR WO IV CONTRAST 10/10/2017 [facility]    HERNIA REPAIR  05/17/2013    Hernia Repair    IR ANGIOGRAM AORTA ABDOMEN  11/29/2017    IR ANGIOGRAM AORTA ABDOMEN 11/29/2017 [facility]    KNEE SURGERY  05/17/2013    Knee Surgery Left    KNEE SURGERY  05/17/2013    Knee Surgery Right       Social History:  Social History     Socioeconomic History    Marital status:      Spouse name: None    Number of children: None    Years of education: None    Highest education level: None   Occupational History    None   Tobacco Use    Smoking status: Never    Smokeless tobacco: Never   Substance and Sexual Activity    Alcohol use: Never    Drug use: Never    Sexual activity: None   Other Topics Concern    None   Social History Narrative    None     Social Determinants of Health     Financial Resource Strain: Not on file   Food Insecurity: Not on file   Transportation Needs: Not on file   Physical Activity: Not on file   Stress: Not on file   Social Connections: Not on file "   Intimate Partner Violence: Not on file   Housing Stability: Not on file       Family History:  Family History   Problem Relation Name Age of Onset    Diabetes Mother      Diabetes Father      Liver cancer Brother           Allergies:  Patient has no known allergies.    Outpatient Medications:  Current Outpatient Medications   Medication Sig Dispense Refill    albuterol (ProAir HFA) 90 mcg/actuation inhaler Inhale 2 puffs every 4 hours if needed for wheezing or shortness of breath. 8.5 g 0    amLODIPine (Norvasc) 5 mg tablet Take 1 tablet (5 mg) by mouth once daily.      aspirin 81 mg EC tablet Take 1 tablet (81 mg) by mouth once daily.      atorvastatin (Lipitor) 40 mg tablet Take 1 tablet (40 mg) by mouth once daily. 90 tablet 0    ferrous sulfate (Iron, ferrous sulfate,) 325 (65 Fe) MG tablet Take 1 tablet by mouth 3 times a week.      lisinopril 20 mg tablet Take 1 tablet (20 mg) by mouth once daily.      metoprolol succinate XL (Toprol-XL) 100 mg 24 hr tablet Take by mouth once daily.      multivitamin with minerals iron-free (CertaVite Senior) Take 1 tablet by mouth once daily.      sildenafil (Viagra) 50 mg tablet Take 1 tablet (50 mg) by mouth if needed. Take APPROXIMATELY 1 HOUR BEFORE SEXUAL ACTIVITY      simvastatin (Zocor) 20 mg tablet Take 1 tablet (20 mg) by mouth once daily.       No current facility-administered medications for this visit.        Physical Exam:  Cardiovascular:      PMI at left midclavicular line. Normal rate. Regular rhythm. Normal S1. Normal S2.       Murmurs: There is no murmur.      No gallop.  No click. No rub.   Pulses:     Intact distal pulses.   Edema:     Peripheral edema absent.         ROS:  Review of Systems   Constitutional: Negative.   Cardiovascular: Negative.    Respiratory: Negative.     Musculoskeletal: Negative.    Gastrointestinal: Negative.    Genitourinary: Negative.    Neurological: Negative.           Last Labs:  CBC -  Lab Results   Component Value Date     WBC 8.2 10/03/2023    HGB 14.7 10/03/2023    HCT 43.7 10/03/2023    MCV 94 10/03/2023     10/03/2023       CMP -  Lab Results   Component Value Date    CALCIUM 9.5 10/03/2023    PHOS 4.5 12/16/2017    PROT 6.8 10/03/2023    ALBUMIN 4.1 10/03/2023    AST 16 10/03/2023    ALT 18 10/03/2023    ALKPHOS 104 10/03/2023    BILITOT 1.0 10/03/2023       LIPID PANEL -   Lab Results   Component Value Date    CHOL 153 10/03/2023    TRIG 125 10/03/2023    HDL 61.0 10/03/2023    CHHDL 2.5 10/03/2023    LDLF 49 12/06/2022    VLDL 25 10/03/2023    NHDL 92 10/03/2023       RENAL FUNCTION PANEL -   Lab Results   Component Value Date    GLUCOSE 104 (H) 10/03/2023     10/03/2023    K 4.7 10/03/2023     10/03/2023    CO2 25 10/03/2023    ANIONGAP 17 10/03/2023    BUN 15 10/03/2023    CREATININE 0.81 10/03/2023    GFRMALE 72 12/06/2022    CALCIUM 9.5 10/03/2023    PHOS 4.5 12/16/2017    ALBUMIN 4.1 10/03/2023        Lab Results   Component Value Date    BNP 27 12/14/2017    HGBA1C 5.5 05/18/2021         Assessment/Plan   Problem List Items Addressed This Visit    None      Impressions     Assessment:     1. Coronary artery disease status post PCI and drug-eluting stent deployment to high-grade LAD stenosis 12/14/2017. This patient is a white male who does have multiple risk factors for coronary artery disease including hypertension, hyperlipidemia, type 2 diabetes, and former smoking. The patient recently was identified as having an abdominal aortic aneurysm and as such was scheduled for noninvasive cardiac testing in preparation for presumptive abdominal aortic aneurysm repair. 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 The patient had an echocardiogram performed at [facility] near that time on 05/28/2014 which estimated the LV ejection fraction in the upper 50% range with no regional wall motion  abnormalities along with moderate aortic valve regurgitation and trace to mild tricuspid valve regurgitation. Recently the patient had a echocardiogram on 08/28/2017 with similar results with respect to an estimated ejection fraction in the upper 50% range with no regional wall motion abnormalities. There is again moderate aortic valve insufficiency and severe pulmonic insufficiency. The patient did have a nuclear stress test however on 09/01/2017 which demonstrated again scarring of the anteroseptal wall including the apex consistent with a prior anteroseptal wall MI consistent with the EKG findings even though his 2 echocardiograms failed to demonstrate a wall motion abnormality. The patient presumably had a clinically silent infarct possibly related to his type 2 diabetes. The patient did undergo diagnostic coronary angiography prior to having his aneurysm repaired as part of preoperative clearance. This was performed on 10/10/2017 and it demonstrated a 70% stenosis of the late proximal LAD just before the diagonal branch along with a complete 100% occlusion of the mid portion of the RCA and a 40% stenosis of the LCx. The patient was thought to require PCI but this was delayed to be an elective procedure following the repair of his abdominal aortic aneurysm. The patient subsequently returned on 12/14/2017 and underwent PCI and drug-eluting stent deployment to the LAD with loss of the jailed first diagonal branch. The patient had an echocardiogram performed on 12/15/2017 following that procedure which showed an estimated LV ejection fraction at 45â€“50 percent with segmental regional wall motion abnormalities. This included akinesis of the apical septal segment and hypokinesis of the mid inferoseptal, apical lateral, apical anterior, and apex segments. There was aortic valve sclerosis with mild 1+ aortic valve insufficiency and moderate dilatation of the ascending thoracic aorta. The patient also had a  pharmacological nuclear stress test on 01/29/2018 that showed fixed decreased perfusion involving the distal anteroseptal wall consistent with scar and an LV ejection fraction of 44%. The patient has been feeling great and his resume fairly normal activities including golfing. He is checking his temperature daily because of the corona virus. He will be allowed to discontinue Plavix at this point in time but remain on daily aspirin. Echo done 11/2021 showed EF 55-60%, mild to moderate TR, mild AS and AI, moderate PI, PASP 35 mm Hg. Pharmacologic nuclear stress test done 12/2023 was negative for ischemia.      2. Abdominal aortic aneurysm status post endograft repair 11/29/2017. This patient did have a abdominal ultrasound on 08/18/2017 demonstrating a 5.2-5.3 cm abdominal aortic aneurysm along with dilatation of the bilateral iliac arteries. The patient underwent endograft replacement of the abdominal aortic aneurysm performed on 11/29/2017 at [facility] which was well tolerated. Patient did have a repeat abdominal ultrasound performed on 02/19/2020 which was technically difficult. US done 07/2021 had no significant change.  Repeat US done 09/2023.     3. Moderate aortic valve insufficiency.     4. Hyperlipidemia. Reasonable response to atorvastatin 40 mg daily. Lipid panel on 12/2022 included cholesterol 133 LDL 49 HDL 65 triglyceride 94.      5. Hypertension. The patient will be switched from Dyazide to metoprolol  mg daily.     6. Type 2 diabetes.     7. Former smoking. This patient discontinued smoking following the death of his wife in 2015 from sarcoma.     8. Status post bilateral arthroscopic knee surgery.     9. Status post left inguinal hernia repair Ã--2 in the 1970s     10. Carotid vascular disease. The patient did have a repeat carotid ultrasound on 02/19/2020 showing a 50-69% stenosis right internal carotid artery and less than 50% stenosis of left internal carotid artery. Repeat carotid  US done 01/2022 showed > 70% stenosis to the right and < 50% to the left. Had right carotid endarterectomy with Dr Davies at [place] 02/13/2023. Had an US done 09/2023     11. Obstructive sleep apnea. The patient did have a formal sleep study performed on 10/06/2017.     12. Hx of covid-19 vaccine #1 and #2.     Nay Quezada, JOHANNA-CNP

## 2024-02-10 DIAGNOSIS — E78.2 MIXED HYPERLIPIDEMIA: ICD-10-CM

## 2024-02-11 RX ORDER — ATORVASTATIN CALCIUM 40 MG/1
40 TABLET, FILM COATED ORAL DAILY
Qty: 90 TABLET | Refills: 0 | Status: SHIPPED | OUTPATIENT
Start: 2024-02-11 | End: 2024-03-25 | Stop reason: SDUPTHER

## 2024-02-13 ENCOUNTER — HOSPITAL ENCOUNTER (OUTPATIENT)
Dept: VASCULAR MEDICINE | Facility: CLINIC | Age: 79
Discharge: HOME | End: 2024-02-13
Payer: MEDICARE

## 2024-02-13 DIAGNOSIS — I65.23 BILATERAL CAROTID ARTERY STENOSIS: ICD-10-CM

## 2024-02-13 PROCEDURE — 93880 EXTRACRANIAL BILAT STUDY: CPT

## 2024-02-13 PROCEDURE — 93880 EXTRACRANIAL BILAT STUDY: CPT | Performed by: SURGERY

## 2024-02-14 ENCOUNTER — APPOINTMENT (OUTPATIENT)
Dept: VASCULAR SURGERY | Facility: CLINIC | Age: 79
End: 2024-02-14
Payer: MEDICARE

## 2024-03-18 ENCOUNTER — LAB (OUTPATIENT)
Dept: LAB | Facility: LAB | Age: 79
End: 2024-03-18
Payer: MEDICARE

## 2024-03-18 DIAGNOSIS — R73.9 ELEVATED BLOOD SUGAR: ICD-10-CM

## 2024-03-18 DIAGNOSIS — E78.2 MIXED HYPERLIPIDEMIA: ICD-10-CM

## 2024-03-18 DIAGNOSIS — I10 BENIGN ESSENTIAL HYPERTENSION: ICD-10-CM

## 2024-03-18 LAB
ALBUMIN SERPL BCP-MCNC: 4.3 G/DL (ref 3.4–5)
ALP SERPL-CCNC: 104 U/L (ref 33–136)
ALT SERPL W P-5'-P-CCNC: 15 U/L (ref 10–52)
ANION GAP SERPL CALC-SCNC: 15 MMOL/L (ref 10–20)
AST SERPL W P-5'-P-CCNC: 18 U/L (ref 9–39)
BILIRUB SERPL-MCNC: 1.1 MG/DL (ref 0–1.2)
BUN SERPL-MCNC: 18 MG/DL (ref 6–23)
CALCIUM SERPL-MCNC: 9.5 MG/DL (ref 8.6–10.6)
CHLORIDE SERPL-SCNC: 102 MMOL/L (ref 98–107)
CHOLEST SERPL-MCNC: 162 MG/DL (ref 0–199)
CHOLESTEROL/HDL RATIO: 2.6
CO2 SERPL-SCNC: 27 MMOL/L (ref 21–32)
CREAT SERPL-MCNC: 0.97 MG/DL (ref 0.5–1.3)
EGFRCR SERPLBLD CKD-EPI 2021: 80 ML/MIN/1.73M*2
ERYTHROCYTE [DISTWIDTH] IN BLOOD BY AUTOMATED COUNT: 12 % (ref 11.5–14.5)
EST. AVERAGE GLUCOSE BLD GHB EST-MCNC: 103 MG/DL
GLUCOSE SERPL-MCNC: 98 MG/DL (ref 74–99)
HBA1C MFR BLD: 5.2 %
HCT VFR BLD AUTO: 42.9 % (ref 41–52)
HDLC SERPL-MCNC: 62.2 MG/DL
HGB BLD-MCNC: 14.8 G/DL (ref 13.5–17.5)
LDLC SERPL CALC-MCNC: 82 MG/DL
MCH RBC QN AUTO: 32 PG (ref 26–34)
MCHC RBC AUTO-ENTMCNC: 34.5 G/DL (ref 32–36)
MCV RBC AUTO: 93 FL (ref 80–100)
NON HDL CHOLESTEROL: 100 MG/DL (ref 0–149)
NRBC BLD-RTO: 0 /100 WBCS (ref 0–0)
PLATELET # BLD AUTO: 203 X10*3/UL (ref 150–450)
POTASSIUM SERPL-SCNC: 4.6 MMOL/L (ref 3.5–5.3)
PROT SERPL-MCNC: 6.8 G/DL (ref 6.4–8.2)
RBC # BLD AUTO: 4.62 X10*6/UL (ref 4.5–5.9)
SODIUM SERPL-SCNC: 139 MMOL/L (ref 136–145)
TRIGL SERPL-MCNC: 91 MG/DL (ref 0–149)
TSH SERPL-ACNC: 1.41 MIU/L (ref 0.44–3.98)
VIT B12 SERPL-MCNC: 292 PG/ML (ref 211–911)
VLDL: 18 MG/DL (ref 0–40)
WBC # BLD AUTO: 8.1 X10*3/UL (ref 4.4–11.3)

## 2024-03-18 PROCEDURE — 80061 LIPID PANEL: CPT

## 2024-03-18 PROCEDURE — 83036 HEMOGLOBIN GLYCOSYLATED A1C: CPT

## 2024-03-18 PROCEDURE — 36415 COLL VENOUS BLD VENIPUNCTURE: CPT

## 2024-03-18 PROCEDURE — 80053 COMPREHEN METABOLIC PANEL: CPT

## 2024-03-18 PROCEDURE — 84443 ASSAY THYROID STIM HORMONE: CPT

## 2024-03-18 PROCEDURE — 82607 VITAMIN B-12: CPT

## 2024-03-18 PROCEDURE — 85027 COMPLETE CBC AUTOMATED: CPT

## 2024-03-25 ENCOUNTER — OFFICE VISIT (OUTPATIENT)
Dept: PRIMARY CARE | Facility: CLINIC | Age: 79
End: 2024-03-25
Payer: MEDICARE

## 2024-03-25 VITALS
DIASTOLIC BLOOD PRESSURE: 72 MMHG | HEIGHT: 67 IN | WEIGHT: 266.4 LBS | SYSTOLIC BLOOD PRESSURE: 134 MMHG | BODY MASS INDEX: 41.81 KG/M2

## 2024-03-25 DIAGNOSIS — I71.40 ABDOMINAL AORTIC ANEURYSM (AAA) WITHOUT RUPTURE, UNSPECIFIED PART (CMS-HCC): Primary | ICD-10-CM

## 2024-03-25 DIAGNOSIS — I25.10 CORONARY ARTERY DISEASE INVOLVING NATIVE HEART WITHOUT ANGINA PECTORIS, UNSPECIFIED VESSEL OR LESION TYPE: ICD-10-CM

## 2024-03-25 DIAGNOSIS — I10 PRIMARY HYPERTENSION: ICD-10-CM

## 2024-03-25 DIAGNOSIS — E78.2 MIXED HYPERLIPIDEMIA: ICD-10-CM

## 2024-03-25 DIAGNOSIS — E66.01 MORBID (SEVERE) OBESITY DUE TO EXCESS CALORIES (MULTI): ICD-10-CM

## 2024-03-25 PROCEDURE — 1159F MED LIST DOCD IN RCRD: CPT | Performed by: INTERNAL MEDICINE

## 2024-03-25 PROCEDURE — 3075F SYST BP GE 130 - 139MM HG: CPT | Performed by: INTERNAL MEDICINE

## 2024-03-25 PROCEDURE — 99214 OFFICE O/P EST MOD 30 MIN: CPT | Performed by: INTERNAL MEDICINE

## 2024-03-25 PROCEDURE — 3078F DIAST BP <80 MM HG: CPT | Performed by: INTERNAL MEDICINE

## 2024-03-25 PROCEDURE — 1160F RVW MEDS BY RX/DR IN RCRD: CPT | Performed by: INTERNAL MEDICINE

## 2024-03-25 RX ORDER — LISINOPRIL 20 MG/1
20 TABLET ORAL DAILY
Qty: 90 TABLET | Refills: 0 | Status: SHIPPED | OUTPATIENT
Start: 2024-03-25 | End: 2024-03-25 | Stop reason: SDUPTHER

## 2024-03-25 RX ORDER — METOPROLOL SUCCINATE 100 MG/1
100 TABLET, EXTENDED RELEASE ORAL DAILY
Qty: 90 TABLET | Refills: 0 | Status: SHIPPED | OUTPATIENT
Start: 2024-03-25 | End: 2024-03-25 | Stop reason: SDUPTHER

## 2024-03-25 RX ORDER — ATORVASTATIN CALCIUM 40 MG/1
40 TABLET, FILM COATED ORAL DAILY
Qty: 90 TABLET | Refills: 1 | Status: SHIPPED | OUTPATIENT
Start: 2024-03-25

## 2024-03-25 RX ORDER — AMLODIPINE BESYLATE 5 MG/1
5 TABLET ORAL DAILY
Qty: 90 TABLET | Refills: 0 | Status: SHIPPED | OUTPATIENT
Start: 2024-03-25 | End: 2024-03-25 | Stop reason: SDUPTHER

## 2024-03-25 RX ORDER — AMLODIPINE BESYLATE 5 MG/1
5 TABLET ORAL DAILY
Qty: 90 TABLET | Refills: 1 | Status: SHIPPED | OUTPATIENT
Start: 2024-03-25

## 2024-03-25 RX ORDER — METOPROLOL SUCCINATE 100 MG/1
100 TABLET, EXTENDED RELEASE ORAL DAILY
Qty: 90 TABLET | Refills: 1 | Status: SHIPPED | OUTPATIENT
Start: 2024-03-25

## 2024-03-25 RX ORDER — ATORVASTATIN CALCIUM 40 MG/1
40 TABLET, FILM COATED ORAL DAILY
Qty: 90 TABLET | Refills: 0 | Status: SHIPPED | OUTPATIENT
Start: 2024-03-25 | End: 2024-03-25 | Stop reason: SDUPTHER

## 2024-03-25 RX ORDER — LISINOPRIL 20 MG/1
20 TABLET ORAL DAILY
Qty: 90 TABLET | Refills: 1 | Status: SHIPPED | OUTPATIENT
Start: 2024-03-25

## 2024-03-31 DIAGNOSIS — I65.23 BILATERAL CAROTID ARTERY STENOSIS: Primary | ICD-10-CM

## 2024-04-01 ENCOUNTER — TELEPHONE (OUTPATIENT)
Dept: VASCULAR MEDICINE | Facility: CLINIC | Age: 79
End: 2024-04-01
Payer: MEDICARE

## 2024-04-07 NOTE — PROGRESS NOTES
Subjective   Patient ID: Garrick Hackett is a 78 y.o. male who presents for Follow-up and Med Refill.    HPI   Patient is here for follow-up  Follow-up on hypertension high cholesterol neuropathy and peripheral vascular disease with coronary artery disease  Overall he is stable   Trying to eat better    Patient is here for follow-up  Wants handicap placard  EMG of both legs done  Follow-up on hypertension high cholesterol high blood sugar  Continues to drink alcohol on regular basis     Past recap   patient is here for preop clearance.  Going for bilateral blepharoplasty for ptosis in both eyelids  Patient still drinks 4 drinks per day  He does have severe motor or sensory quality neuropathy on the EMG  No chest pain no shortness of breath.  Recent cardiac follow-up stable     patient is here for follow-up  Complaining of numbness and tingling in both feet  Went to see the cardiologist separation the leg was fine  Not able to make appointment with the vascular yet  Follow-up on hypertension high cholesterol coronary artery disease    patient here for follow-up  Did blood work  Needs medication refill  Using CPAP occasionally not very noncompliant  Drinking alcohol every day  Doing the chair volleyball but not much activity  Follow-up on hypertension high cholesterol coronary artery disease  Had colonoscopy found to have tubular adenoma due for colonoscopy in 3 years again  Not able to lose weight at all     Patient got CPAP machine  He is using every day for 5-6 hours  He only missed using it when he was at [facility] while on his way to [place]  IHe does feel that CPAP is helping his sleep and he is feeling less tired  Follow-up on blood work  Follow-up on blood pressure cholesterol and aneurysm Had a follow-up with cardiologist for his coronary artery disease  He was drinking quite a bit during holiday season  Has gained weight hyponatremia     Patient is here for follow-up and still not able to lose weight   Second  "stent not Placed not sure if he needs to continue on Plavix  Patient did the sleep study but did not get the CPAP machine records his insurance did not cover the machine  Follow-up on high cholesterol hypertension elevated blood sugar     Still drinking moderately 6 beers a day  Trying to watch diet  Review of Systems    Objective   /72   Ht 1.702 m (5' 7\")   Wt 121 kg (266 lb 6.4 oz)   BMI 41.72 kg/m²     Physical Exam  Vitals reviewed.   Constitutional:       Appearance: Normal appearance. He is obese.   HENT:      Head: Normocephalic and atraumatic.      Right Ear: Tympanic membrane, ear canal and external ear normal.      Left Ear: Tympanic membrane, ear canal and external ear normal.      Nose: Nose normal. No congestion.      Mouth/Throat:      Pharynx: Oropharynx is clear.   Eyes:      Extraocular Movements: Extraocular movements intact.      Conjunctiva/sclera: Conjunctivae normal.      Pupils: Pupils are equal, round, and reactive to light.   Cardiovascular:      Rate and Rhythm: Normal rate and regular rhythm.      Pulses: Normal pulses.      Heart sounds: Normal heart sounds.   Pulmonary:      Effort: Pulmonary effort is normal.      Breath sounds: Normal breath sounds.   Abdominal:      General: Abdomen is flat. Bowel sounds are normal.      Palpations: Abdomen is soft.   Musculoskeletal:      Cervical back: Normal range of motion and neck supple.   Skin:     General: Skin is warm and dry.   Neurological:      General: No focal deficit present.      Mental Status: He is alert and oriented to person, place, and time.   Psychiatric:         Mood and Affect: Mood normal.         Assessment/Plan   Problem List Items Addressed This Visit             ICD-10-CM       Cardiac and Vasculature    Aortic aneurysm, abdominal (CMS/HCC) - Primary I71.40    CAD (coronary artery disease) I25.10    Relevant Medications    amLODIPine (Norvasc) 5 mg tablet    metoprolol succinate XL (Toprol-XL) 100 mg 24 hr tablet "    Hyperlipidemia E78.5    Relevant Medications    amLODIPine (Norvasc) 5 mg tablet    atorvastatin (Lipitor) 40 mg tablet    lisinopril 20 mg tablet    metoprolol succinate XL (Toprol-XL) 100 mg 24 hr tablet    Other Relevant Orders    CBC    Comprehensive Metabolic Panel    Lipid Panel    Hypertension I10    Relevant Medications    amLODIPine (Norvasc) 5 mg tablet    lisinopril 20 mg tablet    metoprolol succinate XL (Toprol-XL) 100 mg 24 hr tablet    Other Relevant Orders    CBC    Comprehensive Metabolic Panel    Lipid Panel       Endocrine/Metabolic    Morbid (severe) obesity due to excess calories (CMS/[facility]) E66.01    past recap  Medicare wellness exam done  Blood pressure stable  Colonoscopy results reviewed  Continue CPAP use for sleep apnea  Blood work results reviewed  Diabetes under control  Cholesterol under control  Blood pressure under control  COPD stable  Follow-up in 6 months  Again encouraged to quit drinking lose weight exercise  Patient agreed to see the cardiologist and vascular surgeon  Reference given       10/9/2023  Numbness and tingling in the feet probably not related to vascular  Pulses are palpable  Most likely neuropathy  We will check EMG test on both legs  We will check vitamin D  Blood pressures stable  Blood work reviewed everything in range  PSA is normal   Vitamin D level low take supplements    10/25/2023  EKG done shows normal sinus rhythm  Blood pressure is stable  Patient clinically stable for low impact surgery  Encourage patient to not drink    12/13/2023  EMG test shows axonal sensory motor see 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  Most likely related to alcohol  Again encourage patient to quit drinking  Take vitamin B12  Handicap sticker given  Blood pressure stable  Cholesterol okay  Due for blood work  Medications refilled  Follow-up in 3 months    3/25/2024  Blood work reviewed  Diabetes under control cholesterol under control  Blood pressure under  control  Again encouraged to quit drinking  Encouraged to use CPAP every day  Again encouraged to lose weight  He is under care of cardiologist  He is under care of vascular for AAA  Routine follow-up in 6 months

## 2024-07-12 PROBLEM — J32.9 SINUSITIS: Status: ACTIVE | Noted: 2024-07-12

## 2024-07-12 PROBLEM — R20.2 PARESTHESIA OF LOWER EXTREMITY: Status: ACTIVE | Noted: 2024-07-12

## 2024-07-12 PROBLEM — N52.9 ERECTILE DYSFUNCTION: Status: ACTIVE | Noted: 2024-07-12

## 2024-07-12 PROBLEM — J40 BRONCHITIS: Status: ACTIVE | Noted: 2024-07-12

## 2024-07-12 PROBLEM — H57.813 PTOSIS OF BOTH EYEBROWS: Status: ACTIVE | Noted: 2024-07-12

## 2024-07-15 ENCOUNTER — OFFICE VISIT (OUTPATIENT)
Dept: CARDIOLOGY | Facility: CLINIC | Age: 79
End: 2024-07-15
Payer: MEDICARE

## 2024-07-15 VITALS
BODY MASS INDEX: 42.06 KG/M2 | WEIGHT: 268 LBS | HEIGHT: 67 IN | SYSTOLIC BLOOD PRESSURE: 152 MMHG | DIASTOLIC BLOOD PRESSURE: 79 MMHG | HEART RATE: 80 BPM | OXYGEN SATURATION: 95 %

## 2024-07-15 DIAGNOSIS — R94.31 ABNORMAL ELECTROCARDIOGRAM (ECG) (EKG): ICD-10-CM

## 2024-07-15 DIAGNOSIS — I50.9 CONGESTIVE HEART FAILURE, UNSPECIFIED HF CHRONICITY, UNSPECIFIED HEART FAILURE TYPE (MULTI): Primary | ICD-10-CM

## 2024-07-15 PROCEDURE — 99214 OFFICE O/P EST MOD 30 MIN: CPT | Performed by: NURSE PRACTITIONER

## 2024-07-15 PROCEDURE — 3078F DIAST BP <80 MM HG: CPT | Performed by: NURSE PRACTITIONER

## 2024-07-15 PROCEDURE — 1159F MED LIST DOCD IN RCRD: CPT | Performed by: NURSE PRACTITIONER

## 2024-07-15 PROCEDURE — 1126F AMNT PAIN NOTED NONE PRSNT: CPT | Performed by: NURSE PRACTITIONER

## 2024-07-15 PROCEDURE — 1036F TOBACCO NON-USER: CPT | Performed by: NURSE PRACTITIONER

## 2024-07-15 PROCEDURE — 1160F RVW MEDS BY RX/DR IN RCRD: CPT | Performed by: NURSE PRACTITIONER

## 2024-07-15 PROCEDURE — 3077F SYST BP >= 140 MM HG: CPT | Performed by: NURSE PRACTITIONER

## 2024-07-15 ASSESSMENT — LIFESTYLE VARIABLES
HOW OFTEN DO YOU HAVE SIX OR MORE DRINKS ON ONE OCCASION: NEVER
HOW OFTEN DO YOU HAVE A DRINK CONTAINING ALCOHOL: 4 OR MORE TIMES A WEEK
HOW MANY STANDARD DRINKS CONTAINING ALCOHOL DO YOU HAVE ON A TYPICAL DAY: 3 OR 4
AUDIT-C TOTAL SCORE: 5
SKIP TO QUESTIONS 9-10: 0

## 2024-07-15 ASSESSMENT — ENCOUNTER SYMPTOMS
GASTROINTESTINAL NEGATIVE: 1
RESPIRATORY NEGATIVE: 1
MUSCULOSKELETAL NEGATIVE: 1
DEPRESSION: 0
CARDIOVASCULAR NEGATIVE: 1
LOSS OF SENSATION IN FEET: 1
OCCASIONAL FEELINGS OF UNSTEADINESS: 0
NEUROLOGICAL NEGATIVE: 1
CONSTITUTIONAL NEGATIVE: 1

## 2024-07-15 ASSESSMENT — COLUMBIA-SUICIDE SEVERITY RATING SCALE - C-SSRS
2. HAVE YOU ACTUALLY HAD ANY THOUGHTS OF KILLING YOURSELF?: NO
6. HAVE YOU EVER DONE ANYTHING, STARTED TO DO ANYTHING, OR PREPARED TO DO ANYTHING TO END YOUR LIFE?: NO
1. IN THE PAST MONTH, HAVE YOU WISHED YOU WERE DEAD OR WISHED YOU COULD GO TO SLEEP AND NOT WAKE UP?: NO

## 2024-07-15 ASSESSMENT — PAIN SCALES - GENERAL: PAINLEVEL: 0-NO PAIN

## 2024-07-15 NOTE — PROGRESS NOTES
"Chief Complaint:   Follow-up (6 month)    History Of Present Illness:    .Mr Hackett returns in follow up.  Denies chest pain, sob, palpitations or pedal edema.           Last Recorded Vitals:  Blood pressure 152/79, pulse 80, height 1.702 m (5' 7\"), weight 122 kg (268 lb), SpO2 95%.     Past Medical History:  Past Medical History:   Diagnosis Date    Personal history of other endocrine, nutritional and metabolic disease 06/12/2020    History of morbid obesity        Past Surgical History:  Past Surgical History:   Procedure Laterality Date    CT ABDOMEN PELVIS ANGIOGRAM W AND/OR WO IV CONTRAST  1/16/2018    CT ABDOMEN PELVIS ANGIOGRAM W AND/OR WO IV CONTRAST 1/16/2018 [facility]    CT ABDOMEN PELVIS ANGIOGRAM W AND/OR WO IV CONTRAST  6/6/2022    CT ABDOMEN PELVIS ANGIOGRAM W AND/OR WO IV CONTRAST 6/6/2022 [facility]    CT ABDOMEN PELVIS ANGIOGRAM W AND/OR WO IV CONTRAST  10/10/2017    CT ABDOMEN PELVIS ANGIOGRAM W AND/OR WO IV CONTRAST 10/10/2017 [facility]    HERNIA REPAIR  05/17/2013    Hernia Repair    IR ANGIOGRAM AORTA ABDOMEN  11/29/2017    IR ANGIOGRAM AORTA ABDOMEN 11/29/2017 [facility]GAC[facility]    KNEE SURGERY  05/17/2013    Knee Surgery Left    KNEE SURGERY  05/17/2013    Knee Surgery Right       Social History:  Social History     Socioeconomic History    Marital status:    Tobacco Use    Smoking status: Never    Smokeless tobacco: Never   Substance and Sexual Activity    Alcohol use: Never    Drug use: Never       Family History:  Family History   Problem Relation Name Age of Onset    Diabetes Mother      Diabetes Father      Liver cancer Brother           Allergies:  Patient has no known allergies.    Outpatient Medications:  Current Outpatient Medications   Medication Sig Dispense Refill    amLODIPine (Norvasc) 5 mg tablet Take 1 tablet (5 mg) by mouth once daily. 90 tablet 1    aspirin 81 mg EC tablet Take 1 tablet (81 mg) by mouth once daily.      atorvastatin " (Lipitor) 40 mg tablet Take 1 tablet (40 mg) by mouth once daily. 90 tablet 1    ferrous sulfate (Iron, ferrous sulfate,) 325 (65 Fe) MG tablet Take 1 tablet by mouth 3 times a week.      lisinopril 20 mg tablet Take 1 tablet (20 mg) by mouth once daily. 90 tablet 1    metoprolol succinate XL (Toprol-XL) 100 mg 24 hr tablet Take 1 tablet (100 mg) by mouth once daily. 90 tablet 1    multivitamin with minerals iron-free (CertaVite Senior) Take 1 tablet by mouth once daily.      sildenafil (Viagra) 50 mg tablet Take 1 tablet (50 mg) by mouth if needed. Take APPROXIMATELY 1 HOUR BEFORE SEXUAL ACTIVITY      albuterol (ProAir HFA) 90 mcg/actuation inhaler Inhale 2 puffs every 4 hours if needed for wheezing or shortness of breath. (Patient not taking: Reported on 7/15/2024) 8.5 g 0     No current facility-administered medications for this visit.        Physical Exam:  Cardiovascular:      PMI at left midclavicular line. Normal rate. Regular rhythm. Normal S1. Normal S2.       Murmurs: There is no murmur.      No gallop.  No click. No rub.   Pulses:     Intact distal pulses.   Edema:     Peripheral edema absent.         ROS:  Review of Systems   Constitutional: Negative.   Cardiovascular: Negative.    Respiratory: Negative.     Skin: Negative.    Musculoskeletal: Negative.    Gastrointestinal: Negative.    Genitourinary: Negative.    Neurological: Negative.           Last Labs:  CBC -  Lab Results   Component Value Date    WBC 8.1 03/18/2024    HGB 14.8 03/18/2024    HCT 42.9 03/18/2024    MCV 93 03/18/2024     03/18/2024       CMP -  Lab Results   Component Value Date    CALCIUM 9.5 03/18/2024    PHOS 4.5 12/16/2017    PROT 6.8 03/18/2024    ALBUMIN 4.3 03/18/2024    AST 18 03/18/2024    ALT 15 03/18/2024    ALKPHOS 104 03/18/2024    BILITOT 1.1 03/18/2024       LIPID PANEL -   Lab Results   Component Value Date    CHOL 162 03/18/2024    TRIG 91 03/18/2024    HDL 62.2 03/18/2024    CHHDL 2.6 03/18/2024    LDLF 49  12/06/2022    VLDL 18 03/18/2024    NHDL 100 03/18/2024       RENAL FUNCTION PANEL -   Lab Results   Component Value Date    GLUCOSE 98 03/18/2024     03/18/2024    K 4.6 03/18/2024     03/18/2024    CO2 27 03/18/2024    ANIONGAP 15 03/18/2024    BUN 18 03/18/2024    CREATININE 0.97 03/18/2024    GFRMALE 72 12/06/2022    CALCIUM 9.5 03/18/2024    PHOS 4.5 12/16/2017    ALBUMIN 4.3 03/18/2024        Lab Results   Component Value Date    BNP 27 12/14/2017    HGBA1C 5.2 03/18/2024         Assessment/Plan   Problem List Items Addressed This Visit    None       Assessment:     1. Coronary artery disease status post PCI and drug-eluting stent deployment to high-grade LAD stenosis 12/14/2017. This patient is a white male who does have multiple risk factors for coronary artery disease including hypertension, hyperlipidemia, type 2 diabetes, and former smoking. The patient recently was identified as having an abdominal aortic aneurysm and as such was scheduled for noninvasive cardiac testing in preparation for presumptive abdominal aortic aneurysm repair. 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 The patient had an echocardiogram performed at [facility], [place], operated by [facility] near that time on 05/28/2014 which estimated the LV ejection fraction in the upper 50% range with no regional wall motion abnormalities along with moderate aortic valve regurgitation and trace to mild tricuspid valve regurgitation. Recently the patient had a echocardiogram on 08/28/2017 with similar results with respect to an estimated ejection fraction in the upper 50% range with no regional wall motion abnormalities. There is again moderate aortic valve insufficiency and severe pulmonic insufficiency. The patient did have a nuclear stress test however on 09/01/2017 which demonstrated again scarring of the anteroseptal wall including the apex  consistent with a prior anteroseptal wall MI consistent with the EKG findings even though his 2 echocardiograms failed to demonstrate a wall motion abnormality. The patient presumably had a clinically silent infarct possibly related to his type 2 diabetes. The patient did undergo diagnostic coronary angiography prior to having his aneurysm repaired as part of preoperative clearance. This was performed on 10/10/2017 and it demonstrated a 70% stenosis of the late proximal LAD just before the diagonal branch along with a complete 100% occlusion of the mid portion of the RCA and a 40% stenosis of the LCx. The patient was thought to require PCI but this was delayed to be an elective procedure following the repair of his abdominal aortic aneurysm. The patient subsequently returned on 12/14/2017 and underwent PCI and drug-eluting stent deployment to the LAD with loss of the jailed first diagonal branch. The patient had an echocardiogram performed on 12/15/2017 following that procedure which showed an estimated LV ejection fraction at 45â€“50 percent with segmental regional wall motion abnormalities. This included akinesis of the apical septal segment and hypokinesis of the mid inferoseptal, apical lateral, apical anterior, and apex segments. There was aortic valve sclerosis with mild 1+ aortic valve insufficiency and moderate dilatation of the ascending thoracic aorta. The patient also had a pharmacological nuclear stress test on 01/29/2018 that showed fixed decreased perfusion involving the distal anteroseptal wall consistent with scar and an LV ejection fraction of 44%. The patient has been feeling great and his resume fairly normal activities including golfing. He is checking his temperature daily because of the corona virus. He will be allowed to discontinue Plavix at this point in time but remain on daily aspirin. Echo done 11/2021 showed EF 55-60%, mild to moderate TR, mild AS and AI, moderate PI, PASP 35 mm Hg.  Pharmacologic nuclear stress test done 12/2023 was negative for ischemia.      2. Abdominal aortic aneurysm status post endograft repair 11/29/2017. This patient did have a abdominal ultrasound on 08/18/2017 demonstrating a 5.2-5.3 cm abdominal aortic aneurysm along with dilatation of the bilateral iliac arteries. The patient underwent endograft replacement of the abdominal aortic aneurysm performed on 11/29/2017 at [facility] which was well tolerated. Patient did have a repeat abdominal ultrasound performed on 02/19/2020 which was technically difficult. US done 07/2021 had no significant change.  Repeat US done 09/2023.     3. Moderate aortic valve insufficiency.     4. Hyperlipidemia. Reasonable response to atorvastatin 40 mg daily. Lipid panel on 12/2022 included cholesterol 133 LDL 49 HDL 65 triglyceride 94.      5. Hypertension. The patient will be switched from Dyazide to metoprolol  mg daily.     6. Type 2 diabetes.     7. Former smoking. This patient discontinued smoking following the death of his wife in 2015 from sarcoma.     8. Status post bilateral arthroscopic knee surgery.     9. Status post left inguinal hernia repair Ã--2 in the 1970s     10. Carotid vascular disease. The patient did have a repeat carotid ultrasound on 02/19/2020 showing a 50-69% stenosis right internal carotid artery and less than 50% stenosis of left internal carotid artery. Repeat carotid US done 01/2022 showed > 70% stenosis to the right and < 50% to the left. Had right carotid endarterectomy with Dr Davies at [place] 02/13/2023. Had an US done 09/2023 and again 02/2024.  Sees vascular.     11. Obstructive sleep apnea. The patient did have a formal sleep study performed on 10/06/2017.     12. Hx of covid-19 vaccine #1 and #2.          Nay Quezada, APRN-CNP

## 2024-09-11 ENCOUNTER — HOSPITAL ENCOUNTER (OUTPATIENT)
Dept: VASCULAR MEDICINE | Facility: CLINIC | Age: 79
Discharge: HOME | End: 2024-09-11
Payer: MEDICARE

## 2024-09-11 ENCOUNTER — OFFICE VISIT (OUTPATIENT)
Dept: VASCULAR SURGERY | Facility: CLINIC | Age: 79
End: 2024-09-11
Payer: MEDICARE

## 2024-09-11 VITALS
WEIGHT: 269.6 LBS | SYSTOLIC BLOOD PRESSURE: 122 MMHG | DIASTOLIC BLOOD PRESSURE: 76 MMHG | OXYGEN SATURATION: 94 % | HEIGHT: 67 IN | HEART RATE: 71 BPM | BODY MASS INDEX: 42.31 KG/M2

## 2024-09-11 DIAGNOSIS — I65.23 BILATERAL CAROTID ARTERY STENOSIS: ICD-10-CM

## 2024-09-11 DIAGNOSIS — I71.43 INFRARENAL ABDOMINAL AORTIC ANEURYSM (AAA) WITHOUT RUPTURE (CMS-HCC): ICD-10-CM

## 2024-09-11 DIAGNOSIS — I65.23 BILATERAL CAROTID ARTERY STENOSIS: Primary | ICD-10-CM

## 2024-09-11 DIAGNOSIS — Z98.890 S/P CAROTID ENDARTERECTOMY: ICD-10-CM

## 2024-09-11 DIAGNOSIS — Z98.890 S/P ENDOVASCULAR ANEURYSM REPAIR: ICD-10-CM

## 2024-09-11 DIAGNOSIS — Z86.79 S/P ENDOVASCULAR ANEURYSM REPAIR: ICD-10-CM

## 2024-09-11 LAB — BODY SURFACE AREA: 2.4 M2

## 2024-09-11 PROCEDURE — 1126F AMNT PAIN NOTED NONE PRSNT: CPT | Performed by: NURSE PRACTITIONER

## 2024-09-11 PROCEDURE — 1036F TOBACCO NON-USER: CPT | Performed by: NURSE PRACTITIONER

## 2024-09-11 PROCEDURE — 99213 OFFICE O/P EST LOW 20 MIN: CPT | Performed by: NURSE PRACTITIONER

## 2024-09-11 PROCEDURE — 93880 EXTRACRANIAL BILAT STUDY: CPT

## 2024-09-11 PROCEDURE — 1160F RVW MEDS BY RX/DR IN RCRD: CPT | Performed by: NURSE PRACTITIONER

## 2024-09-11 PROCEDURE — 1159F MED LIST DOCD IN RCRD: CPT | Performed by: NURSE PRACTITIONER

## 2024-09-11 PROCEDURE — 3078F DIAST BP <80 MM HG: CPT | Performed by: NURSE PRACTITIONER

## 2024-09-11 PROCEDURE — 3074F SYST BP LT 130 MM HG: CPT | Performed by: NURSE PRACTITIONER

## 2024-09-11 ASSESSMENT — COLUMBIA-SUICIDE SEVERITY RATING SCALE - C-SSRS
1. IN THE PAST MONTH, HAVE YOU WISHED YOU WERE DEAD OR WISHED YOU COULD GO TO SLEEP AND NOT WAKE UP?: NO
2. HAVE YOU ACTUALLY HAD ANY THOUGHTS OF KILLING YOURSELF?: NO
6. HAVE YOU EVER DONE ANYTHING, STARTED TO DO ANYTHING, OR PREPARED TO DO ANYTHING TO END YOUR LIFE?: NO

## 2024-09-11 ASSESSMENT — PATIENT HEALTH QUESTIONNAIRE - PHQ9
1. LITTLE INTEREST OR PLEASURE IN DOING THINGS: NOT AT ALL
SUM OF ALL RESPONSES TO PHQ9 QUESTIONS 1 AND 2: 0
2. FEELING DOWN, DEPRESSED OR HOPELESS: NOT AT ALL

## 2024-09-11 ASSESSMENT — PAIN SCALES - GENERAL: PAINLEVEL: 0-NO PAIN

## 2024-09-11 NOTE — PATIENT INSTRUCTIONS
Garrick,    It was great to see you again!  Thank you for choosing  vascular surgery for your care.    We discussed your carotid ultrasound results which showed less than 50% narrowing of both internal carotid arteries.  This means your surgery was a success.  We will continue to monitor with ultrasound.    Additionally, you are due to check your aneurysm repair with an ultrasound.  Please schedule this as well as a follow-up virtual visit with me so I can go over the results with you.

## 2024-09-11 NOTE — PROGRESS NOTES
History Of Present Illness  Garrick Hackett is a 78 y.o. male presenting with a history of right carotid endarterectomy in February 2023.  Patient also has a history of  EVAR + endofixation for AAA, via bilateral open groin exposure ([place]) in 2017.  Patient returns for surveillance duplex and follow-up office visit for carotids.  Denies any symptoms of stroke, TIA, or amaurosis fugax.  Also, due for surveillance duplex of EVAR graft.  Denies any symptoms of abdominal pain or back pain related to the aorta.     Past Medical History  He has a past medical history of Personal history of other endocrine, nutritional and metabolic disease (06/12/2020).    Surgical History  He has a past surgical history that includes Hernia repair (05/17/2013); Knee surgery (05/17/2013); Knee surgery (05/17/2013); CT angio abdomen pelvis w and or wo IV IV contrast (1/16/2018); CT angio abdomen pelvis w and or wo IV IV contrast (6/6/2022); CT angio abdomen pelvis w and or wo IV IV contrast (10/10/2017); and IR angiogram aorta abdomen (11/29/2017).     Social History  He reports that he has never smoked. He has never used smokeless tobacco. He reports that he does not drink alcohol and does not use drugs.    Family History  Family History   Problem Relation Name Age of Onset    Diabetes Mother      Diabetes Father      Liver cancer Brother          Allergies  Patient has no known allergies.    Review of Systems    CONSTITUTIONAL: Denies weight loss, fever and chills.    HEENT: Denies changes in vision and hearing.    RESPIRATORY: Denies SOB and cough.    CV: Denies palpitations and CP.    GI: Denies abdominal pain, nausea, vomiting and diarrhea.    : Denies dysuria and urinary frequency.    MSK: Denies myalgia and joint pain.    SKIN: Denies rash and pruritus.    VASC: Denies claudication, ischemic rest pain, or open wounds or sores    NEUROLOGICAL: Denies headache and syncope.    PSYCHIATRIC: Denies recent changes in mood. Denies  anxiety and depression.     Physical Exam    General: Pt is alert and oriented x 3. Pleasant, conversive  HEENT: Head is atraumatic, normocephalic.  Cardiac: Normal S1-S2.  Regular rate and rhythm.  No murmurs.  Respiratory: Lungs clear to auscultation.  No adventitious sounds.  Abdomen: Soft, nondistended, nontender.  Bowel sounds x4 quadrants.  Protuberant abdomen.  Pulse exam: Palpable brachial and radial pulses bilaterall.  Difficult to palpate femoral pulses due to body habitus.  Extremities: No significant edema noted.  Extremities are warm to the touch and normal in color.  No open wounds or sores.  Neuro: Moves all extremities spontaneously.  No focal deficits.  Psych: Appropriate affect.  Answers questions appropriately.     Last Recorded Vitals  /76 (BP Location: Right arm)   Pulse 71   Wt 122 kg (269 lb 9.6 oz)   SpO2 94%     Relevant Results    Current Outpatient Medications   Medication Instructions    amLODIPine (NORVASC) 5 mg, oral, Daily    aspirin 81 mg, oral, Daily    atorvastatin (LIPITOR) 40 mg, oral, Daily    ferrous sulfate (Iron, ferrous sulfate,) 325 (65 Fe) MG tablet 1 tablet, oral, 3 times weekly    lisinopril 20 mg, oral, Daily    metoprolol succinate XL (TOPROL-XL) 100 mg, oral, Daily    multivitamin with minerals iron-free (CertaVite Senior) 1 tablet, oral, Daily    sildenafil (Viagra) 50 mg tablet 1 tablet, oral, As needed, Take APPROXIMATELY 1 HOUR BEFORE SEXUAL ACTIVITY<BR>         Assessment/Plan   Carotid artery disease, status post right carotid endarterectomy  Abdominal aortic aneurysm status post endovascular aneurysm repair in 2017    I reviewed the results of the patient's carotid artery duplex which was completed in the office today.  The patient had pulsatile vessels.  Both carotid artery showed less than 50% stenosis by velocity.  Right ECA suggestive of disease.  Patient is stable from a vascular standpoint and will follow-up again in 1 year for reevaluation which  will include a previsit carotid artery duplex.  Additionally, patient is due for surveillance duplex of his endovascular aneurysm repair.  Will schedule this in the near future and then for the 2025 visit, you will have both tests and an office visit on the same day.  Continue aspirin and statin.       Angelo Arriaga, APRN-CNP

## 2024-09-12 ENCOUNTER — LAB (OUTPATIENT)
Dept: LAB | Facility: LAB | Age: 79
End: 2024-09-12
Payer: MEDICARE

## 2024-09-12 DIAGNOSIS — E78.2 MIXED HYPERLIPIDEMIA: ICD-10-CM

## 2024-09-12 DIAGNOSIS — I10 PRIMARY HYPERTENSION: ICD-10-CM

## 2024-09-12 LAB
ALBUMIN SERPL BCP-MCNC: 4.3 G/DL (ref 3.4–5)
ALP SERPL-CCNC: 106 U/L (ref 33–136)
ALT SERPL W P-5'-P-CCNC: 20 U/L (ref 10–52)
ANION GAP SERPL CALC-SCNC: 16 MMOL/L (ref 10–20)
AST SERPL W P-5'-P-CCNC: 17 U/L (ref 9–39)
BILIRUB SERPL-MCNC: 0.9 MG/DL (ref 0–1.2)
BUN SERPL-MCNC: 19 MG/DL (ref 6–23)
CALCIUM SERPL-MCNC: 9.6 MG/DL (ref 8.6–10.6)
CHLORIDE SERPL-SCNC: 101 MMOL/L (ref 98–107)
CHOLEST SERPL-MCNC: 157 MG/DL (ref 0–199)
CHOLESTEROL/HDL RATIO: 2.4
CO2 SERPL-SCNC: 25 MMOL/L (ref 21–32)
CREAT SERPL-MCNC: 0.95 MG/DL (ref 0.5–1.3)
EGFRCR SERPLBLD CKD-EPI 2021: 82 ML/MIN/1.73M*2
ERYTHROCYTE [DISTWIDTH] IN BLOOD BY AUTOMATED COUNT: 12.1 % (ref 11.5–14.5)
GLUCOSE SERPL-MCNC: 89 MG/DL (ref 74–99)
HCT VFR BLD AUTO: 41 % (ref 41–52)
HDLC SERPL-MCNC: 64.8 MG/DL
HGB BLD-MCNC: 14.1 G/DL (ref 13.5–17.5)
LDLC SERPL CALC-MCNC: 63 MG/DL
MCH RBC QN AUTO: 31.4 PG (ref 26–34)
MCHC RBC AUTO-ENTMCNC: 34.4 G/DL (ref 32–36)
MCV RBC AUTO: 91 FL (ref 80–100)
NON HDL CHOLESTEROL: 92 MG/DL (ref 0–149)
NRBC BLD-RTO: 0 /100 WBCS (ref 0–0)
PLATELET # BLD AUTO: 191 X10*3/UL (ref 150–450)
POTASSIUM SERPL-SCNC: 5.1 MMOL/L (ref 3.5–5.3)
PROT SERPL-MCNC: 6.9 G/DL (ref 6.4–8.2)
RBC # BLD AUTO: 4.49 X10*6/UL (ref 4.5–5.9)
SODIUM SERPL-SCNC: 137 MMOL/L (ref 136–145)
TRIGL SERPL-MCNC: 144 MG/DL (ref 0–149)
VLDL: 29 MG/DL (ref 0–40)
WBC # BLD AUTO: 7.4 X10*3/UL (ref 4.4–11.3)

## 2024-09-12 PROCEDURE — 85027 COMPLETE CBC AUTOMATED: CPT

## 2024-09-12 PROCEDURE — 36415 COLL VENOUS BLD VENIPUNCTURE: CPT

## 2024-09-12 PROCEDURE — 80061 LIPID PANEL: CPT

## 2024-09-12 PROCEDURE — 80053 COMPREHEN METABOLIC PANEL: CPT

## 2024-09-18 ENCOUNTER — APPOINTMENT (OUTPATIENT)
Dept: VASCULAR SURGERY | Facility: CLINIC | Age: 79
End: 2024-09-18
Payer: MEDICARE

## 2024-09-18 ENCOUNTER — APPOINTMENT (OUTPATIENT)
Dept: VASCULAR MEDICINE | Facility: CLINIC | Age: 79
End: 2024-09-18
Payer: MEDICARE

## 2024-09-23 ENCOUNTER — APPOINTMENT (OUTPATIENT)
Dept: PRIMARY CARE | Facility: CLINIC | Age: 79
End: 2024-09-23
Payer: MEDICARE

## 2024-09-23 VITALS
HEIGHT: 67 IN | SYSTOLIC BLOOD PRESSURE: 136 MMHG | BODY MASS INDEX: 42.22 KG/M2 | DIASTOLIC BLOOD PRESSURE: 70 MMHG | WEIGHT: 269 LBS

## 2024-09-23 DIAGNOSIS — Z12.5 ENCOUNTER FOR PROSTATE CANCER SCREENING: ICD-10-CM

## 2024-09-23 DIAGNOSIS — I71.40 ABDOMINAL AORTIC ANEURYSM (AAA) WITHOUT RUPTURE, UNSPECIFIED PART (CMS-HCC): Primary | ICD-10-CM

## 2024-09-23 DIAGNOSIS — E78.2 MIXED HYPERLIPIDEMIA: ICD-10-CM

## 2024-09-23 DIAGNOSIS — I10 PRIMARY HYPERTENSION: ICD-10-CM

## 2024-09-23 DIAGNOSIS — E66.01 MORBID OBESITY (MULTI): ICD-10-CM

## 2024-09-23 DIAGNOSIS — G47.33 OBSTRUCTIVE SLEEP APNEA: ICD-10-CM

## 2024-09-23 PROCEDURE — 1159F MED LIST DOCD IN RCRD: CPT | Performed by: INTERNAL MEDICINE

## 2024-09-23 PROCEDURE — 3075F SYST BP GE 130 - 139MM HG: CPT | Performed by: INTERNAL MEDICINE

## 2024-09-23 PROCEDURE — 3078F DIAST BP <80 MM HG: CPT | Performed by: INTERNAL MEDICINE

## 2024-09-23 PROCEDURE — 99214 OFFICE O/P EST MOD 30 MIN: CPT | Performed by: INTERNAL MEDICINE

## 2024-09-23 RX ORDER — METOPROLOL SUCCINATE 100 MG/1
100 TABLET, EXTENDED RELEASE ORAL DAILY
Qty: 90 TABLET | Refills: 1 | Status: SHIPPED | OUTPATIENT
Start: 2024-09-23

## 2024-09-23 RX ORDER — AMLODIPINE BESYLATE 5 MG/1
5 TABLET ORAL DAILY
Qty: 90 TABLET | Refills: 1 | Status: SHIPPED | OUTPATIENT
Start: 2024-09-23

## 2024-09-23 RX ORDER — ATORVASTATIN CALCIUM 40 MG/1
40 TABLET, FILM COATED ORAL DAILY
Qty: 90 TABLET | Refills: 1 | Status: SHIPPED | OUTPATIENT
Start: 2024-09-23

## 2024-09-23 RX ORDER — LISINOPRIL 20 MG/1
20 TABLET ORAL DAILY
Qty: 90 TABLET | Refills: 1 | Status: SHIPPED | OUTPATIENT
Start: 2024-09-23

## 2024-09-23 NOTE — PROGRESS NOTES
Subjective   Patient ID: Garrick Hackett is a 79 y.o. male who presents for Follow-up and Med Refill.    HPI   Patient is here for follow-up  Follow-up on hypertension high cholesterol neuropathy and peripheral vascular disease with coronary artery disease  Overall he is stable   Trying to eat better    Patient is here for follow-up  Wants handicap placard  EMG of both legs done  Follow-up on hypertension high cholesterol high blood sugar  Continues to drink alcohol on regular basis     Past recap   patient is here for preop clearance.  Going for bilateral blepharoplasty for ptosis in both eyelids  Patient still drinks 4 drinks per day  He does have severe motor or sensory quality neuropathy on the EMG  No chest pain no shortness of breath.  Recent cardiac follow-up stable     patient is here for follow-up  Complaining of numbness and tingling in both feet  Went to see the cardiologist separation the leg was fine  Not able to make appointment with the vascular yet  Follow-up on hypertension high cholesterol coronary artery disease    patient here for follow-up  Did blood work  Needs medication refill  Using CPAP occasionally not very noncompliant  Drinking alcohol every day  Doing the chair volleyball but not much activity  Follow-up on hypertension high cholesterol coronary artery disease  Had colonoscopy found to have tubular adenoma due for colonoscopy in 3 years again  Not able to lose weight at all     Patient got CPAP machine  He is using every day for 5-6 hours  He only missed using it when he was at [facility] while on his way to [place]  IHe does feel that CPAP is helping his sleep and he is feeling less tired  Follow-up on blood work  Follow-up on blood pressure cholesterol and aneurysm Had a follow-up with cardiologist for his coronary artery disease  He was drinking quite a bit during holiday season  Has gained weight hyponatremia     Patient is here for follow-up and still not able to lose weight   Second  "stent not Placed not sure if he needs to continue on Plavix  Patient did the sleep study but did not get the CPAP machine records his insurance did not cover the machine  Follow-up on high cholesterol hypertension elevated blood sugar     Still drinking moderately 6 beers a day  Trying to watch diet  Review of Systems    Objective   /70   Ht 1.702 m (5' 7\")   Wt 122 kg (269 lb)   BMI 42.13 kg/m²     Physical Exam  Vitals reviewed.   Constitutional:       Appearance: Normal appearance. He is obese.   HENT:      Head: Normocephalic and atraumatic.      Right Ear: Tympanic membrane, ear canal and external ear normal.      Left Ear: Tympanic membrane, ear canal and external ear normal.      Nose: Nose normal. No congestion.      Mouth/Throat:      Pharynx: Oropharynx is clear.   Eyes:      Extraocular Movements: Extraocular movements intact.      Conjunctiva/sclera: Conjunctivae normal.      Pupils: Pupils are equal, round, and reactive to light.   Cardiovascular:      Rate and Rhythm: Normal rate and regular rhythm.      Pulses: Normal pulses.      Heart sounds: Normal heart sounds.   Pulmonary:      Effort: Pulmonary effort is normal.      Breath sounds: Normal breath sounds.   Abdominal:      General: Abdomen is flat. Bowel sounds are normal.      Palpations: Abdomen is soft.   Musculoskeletal:      Cervical back: Normal range of motion and neck supple.   Skin:     General: Skin is warm and dry.   Neurological:      General: No focal deficit present.      Mental Status: He is alert and oriented to person, place, and time.   Psychiatric:         Mood and Affect: Mood normal.         Assessment/Plan   Problem List Items Addressed This Visit             ICD-10-CM       Cardiac and Vasculature    Aortic aneurysm, abdominal (CMS-HCC) - Primary I71.40    Hyperlipidemia E78.5    Relevant Medications    metoprolol succinate XL (Toprol-XL) 100 mg 24 hr tablet    lisinopril 20 mg tablet    atorvastatin (Lipitor) 40 mg " tablet    amLODIPine (Norvasc) 5 mg tablet    Other Relevant Orders    CBC    Comprehensive Metabolic Panel    Thyroid Stimulating Hormone    Lipid Panel    Hypertension I10    Relevant Medications    metoprolol succinate XL (Toprol-XL) 100 mg 24 hr tablet    lisinopril 20 mg tablet    amLODIPine (Norvasc) 5 mg tablet    Other Relevant Orders    CBC    Comprehensive Metabolic Panel    Thyroid Stimulating Hormone    Lipid Panel       Endocrine/Metabolic    Morbid obesity (Multi) E66.01       Sleep    Obstructive sleep apnea G47.33     Other Visit Diagnoses         Codes    Encounter for prostate cancer screening     Z12.5    Relevant Orders    Prostate Specific Antigen, Screen         past recap  Medicare wellness exam done  Blood pressure stable  Colonoscopy results reviewed  Continue CPAP use for sleep apnea  Blood work results reviewed  Diabetes under control  Cholesterol under control  Blood pressure under control  COPD stable  Follow-up in 6 months  Again encouraged to quit drinking lose weight exercise  Patient agreed to see the cardiologist and vascular surgeon  Reference given       10/9/2023  Numbness and tingling in the feet probably not related to vascular  Pulses are palpable  Most likely neuropathy  We will check EMG test on both legs  We will check vitamin D  Blood pressures stable  Blood work reviewed everything in range  PSA is normal   Vitamin D level low take supplements    10/25/2023  EKG done shows normal sinus rhythm  Blood pressure is stable  Patient clinically stable for low impact surgery  Encourage patient to not drink    12/13/2023  EMG test shows axonal sensory motor see 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  Most likely related to alcohol  Again encourage patient to quit drinking  Take vitamin B12  Handicap sticker given  Blood pressure stable  Cholesterol okay  Due for blood work  Medications refilled  Follow-up in 3 months    3/25/2024  Blood work reviewed  Diabetes  under control cholesterol under control  Blood pressure under control  Again encouraged to quit drinking  Encouraged to use CPAP every day  Again encouraged to lose weight  He is under care of cardiologist  He is under care of vascular for AAA  Routine follow-up in 6 months    9/23/2024  He has gained few pounds   blood pressure stable  Work is all in range  Cholesterol very well-controlled  Again encouraged to quit drinking  Encouraged to use CPAP  Under care of cardiologist for coronary artery disease and vascular follow-up AAA  Medications refilled  Diet and exercise  Follow-up in 6 months

## 2024-10-08 ENCOUNTER — HOSPITAL ENCOUNTER (OUTPATIENT)
Dept: VASCULAR MEDICINE | Facility: CLINIC | Age: 79
Discharge: HOME | End: 2024-10-08
Payer: MEDICARE

## 2024-10-08 DIAGNOSIS — Z98.890 S/P ENDOVASCULAR ANEURYSM REPAIR: ICD-10-CM

## 2024-10-08 DIAGNOSIS — Z86.79 S/P ENDOVASCULAR ANEURYSM REPAIR: ICD-10-CM

## 2024-10-08 DIAGNOSIS — I71.43 INFRARENAL ABDOMINAL AORTIC ANEURYSM (AAA) WITHOUT RUPTURE (CMS-HCC): ICD-10-CM

## 2024-10-08 PROCEDURE — 93978 VASCULAR STUDY: CPT

## 2024-10-08 PROCEDURE — 93978 VASCULAR STUDY: CPT | Performed by: SURGERY

## 2024-10-09 ENCOUNTER — APPOINTMENT (OUTPATIENT)
Dept: VASCULAR SURGERY | Facility: CLINIC | Age: 79
End: 2024-10-09
Payer: MEDICARE

## 2024-10-09 DIAGNOSIS — I71.43 INFRARENAL ABDOMINAL AORTIC ANEURYSM, WITHOUT RUPTURE (CMS-HCC): ICD-10-CM

## 2024-10-09 DIAGNOSIS — Z86.79 S/P ENDOVASCULAR ANEURYSM REPAIR: Primary | ICD-10-CM

## 2024-10-09 DIAGNOSIS — Z98.890 S/P ENDOVASCULAR ANEURYSM REPAIR: Primary | ICD-10-CM

## 2024-10-09 PROCEDURE — 99442 PR PHYS/QHP TELEPHONE EVALUATION 11-20 MIN: CPT | Performed by: NURSE PRACTITIONER

## 2024-11-12 ENCOUNTER — APPOINTMENT (OUTPATIENT)
Dept: PRIMARY CARE | Facility: CLINIC | Age: 79
End: 2024-11-12
Payer: MEDICARE

## 2024-11-12 VITALS
DIASTOLIC BLOOD PRESSURE: 68 MMHG | HEIGHT: 67 IN | BODY MASS INDEX: 42.53 KG/M2 | WEIGHT: 271 LBS | SYSTOLIC BLOOD PRESSURE: 126 MMHG

## 2024-11-12 DIAGNOSIS — R41.3 POOR SHORT TERM MEMORY: ICD-10-CM

## 2024-11-12 DIAGNOSIS — H91.90 HEARING LOSS, UNSPECIFIED HEARING LOSS TYPE, UNSPECIFIED LATERALITY: ICD-10-CM

## 2024-11-12 DIAGNOSIS — Z23 ENCOUNTER FOR IMMUNIZATION: ICD-10-CM

## 2024-11-12 DIAGNOSIS — Z00.00 MEDICARE ANNUAL WELLNESS VISIT, SUBSEQUENT: Primary | ICD-10-CM

## 2024-11-12 PROCEDURE — 3078F DIAST BP <80 MM HG: CPT | Performed by: INTERNAL MEDICINE

## 2024-11-12 PROCEDURE — G0009 ADMIN PNEUMOCOCCAL VACCINE: HCPCS | Performed by: INTERNAL MEDICINE

## 2024-11-12 PROCEDURE — G0439 PPPS, SUBSEQ VISIT: HCPCS | Performed by: INTERNAL MEDICINE

## 2024-11-12 PROCEDURE — 3074F SYST BP LT 130 MM HG: CPT | Performed by: INTERNAL MEDICINE

## 2024-11-12 PROCEDURE — 99213 OFFICE O/P EST LOW 20 MIN: CPT | Performed by: INTERNAL MEDICINE

## 2024-11-12 PROCEDURE — 1170F FXNL STATUS ASSESSED: CPT | Performed by: INTERNAL MEDICINE

## 2024-11-12 PROCEDURE — 90677 PCV20 VACCINE IM: CPT | Performed by: INTERNAL MEDICINE

## 2024-11-12 ASSESSMENT — ACTIVITIES OF DAILY LIVING (ADL)
DOING_HOUSEWORK: INDEPENDENT
TAKING_MEDICATION: INDEPENDENT
DRESSING: INDEPENDENT
GROCERY_SHOPPING: INDEPENDENT
MANAGING_FINANCES: INDEPENDENT
BATHING: INDEPENDENT

## 2024-11-12 ASSESSMENT — PATIENT HEALTH QUESTIONNAIRE - PHQ9
SUM OF ALL RESPONSES TO PHQ9 QUESTIONS 1 AND 2: 1
2. FEELING DOWN, DEPRESSED OR HOPELESS: NOT AT ALL
1. LITTLE INTEREST OR PLEASURE IN DOING THINGS: SEVERAL DAYS

## 2024-11-12 ASSESSMENT — ENCOUNTER SYMPTOMS
OCCASIONAL FEELINGS OF UNSTEADINESS: 1
LOSS OF SENSATION IN FEET: 1
DEPRESSION: 0

## 2024-11-12 NOTE — PROGRESS NOTES
Subjective   Patient ID: Garrick Hackett is a 79 y.o. male who presents for Medicare Annual Wellness Visit Subsequent.    HPI   Patient here for Medicare annual wellness exam   patient is here for follow-up  Follow-up on hypertension high cholesterol neuropathy and peripheral vascular disease with coronary artery disease  Overall he is stable   Trying to eat better    Patient is here for follow-up  Wants handicap placard  EMG of both legs done  Follow-up on hypertension high cholesterol high blood sugar  Continues to drink alcohol on regular basis     Past recap   patient is here for preop clearance.  Going for bilateral blepharoplasty for ptosis in both eyelids  Patient still drinks 4 drinks per day  He does have severe motor or sensory quality neuropathy on the EMG  No chest pain no shortness of breath.  Recent cardiac follow-up stable     patient is here for follow-up  Complaining of numbness and tingling in both feet  Went to see the cardiologist separation the leg was fine  Not able to make appointment with the vascular yet  Follow-up on hypertension high cholesterol coronary artery disease    patient here for follow-up  Did blood work  Needs medication refill  Using CPAP occasionally not very noncompliant  Drinking alcohol every day  Doing the chair volleyball but not much activity  Follow-up on hypertension high cholesterol coronary artery disease  Had colonoscopy found to have tubular adenoma due for colonoscopy in 3 years again  Not able to lose weight at all     Patient got CPAP machine  He is using every day for 5-6 hours  He only missed using it when he was at [facility] does feel that CPAP is helping his sleep and he is feeling less tired  Follow-up on blood work  Follow-up on blood pressure cholesterol and aneurysm Had a follow-up with cardiologist for his coronary artery disease  He was drinking quite a bit during holiday season  Has gained weight hyponatremia     Patient is  "here for follow-up and still not able to lose weight   Second stent not Placed not sure if he needs to continue on Plavix  Patient did the sleep study but did not get the CPAP machine records his insurance did not cover the machine  Follow-up on high cholesterol hypertension elevated blood sugar     Still drinking moderately 6 beers a day  Trying to watch diet  Review of Systems    Objective   /68   Ht 1.702 m (5' 7\")   Wt 123 kg (271 lb)   BMI 42.44 kg/m²     Physical Exam  Vitals reviewed.   Constitutional:       Appearance: Normal appearance. He is obese.   HENT:      Head: Normocephalic and atraumatic.      Right Ear: Tympanic membrane, ear canal and external ear normal.      Left Ear: Tympanic membrane, ear canal and external ear normal.      Nose: Nose normal. No congestion.      Mouth/Throat:      Pharynx: Oropharynx is clear.   Eyes:      Extraocular Movements: Extraocular movements intact.      Conjunctiva/sclera: Conjunctivae normal.      Pupils: Pupils are equal, round, and reactive to light.   Cardiovascular:      Rate and Rhythm: Normal rate and regular rhythm.      Pulses: Normal pulses.      Heart sounds: Normal heart sounds.   Pulmonary:      Effort: Pulmonary effort is normal.      Breath sounds: Normal breath sounds.   Abdominal:      General: Abdomen is flat. Bowel sounds are normal.      Palpations: Abdomen is soft.   Musculoskeletal:      Cervical back: Normal range of motion and neck supple.   Skin:     General: Skin is warm and dry.   Neurological:      General: No focal deficit present.      Mental Status: He is alert and oriented to person, place, and time.   Psychiatric:         Mood and Affect: Mood normal.         Assessment/Plan   Problem List Items Addressed This Visit    None  Visit Diagnoses         Codes    Medicare annual wellness visit, subsequent    -  Primary Z00.00    Encounter for immunization     Z23    Relevant Orders    Pneumococcal conjugate vaccine, 20-valent " (PREVNAR 20) (Completed)    Hearing loss, unspecified hearing loss type, unspecified laterality     H91.90    Poor short term memory     R41.3         past recap  Medicare wellness exam done  Blood pressure stable  Colonoscopy results reviewed  Continue CPAP use for sleep apnea  Blood work results reviewed  Diabetes under control  Cholesterol under control  Blood pressure under control  COPD stable  Follow-up in 6 months  Again encouraged to quit drinking lose weight exercise  Patient agreed to see the cardiologist and vascular surgeon  Reference given       10/9/2023  Numbness and tingling in the feet probably not related to vascular  Pulses are palpable  Most likely neuropathy  We will check EMG test on both legs  We will check vitamin D  Blood pressures stable  Blood work reviewed everything in range  PSA is normal   Vitamin D level low take supplements    10/25/2023  EKG done shows normal sinus rhythm  Blood pressure is stable  Patient clinically stable for low impact surgery  Encourage patient to not drink    12/13/2023  EMG test shows axonal sensory motor see 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  Most likely related to alcohol  Again encourage patient to quit drinking  Take vitamin B12  Handicap sticker given  Blood pressure stable  Cholesterol okay  Due for blood work  Medications refilled  Follow-up in 3 months    3/25/2024  Blood work reviewed  Diabetes under control cholesterol under control  Blood pressure under control  Again encouraged to quit drinking  Encouraged to use CPAP every day  Again encouraged to lose weight  He is under care of cardiologist  He is under care of vascular for AAA  Routine follow-up in 6 months    9/23/2024  He has gained few pounds   blood pressure stable  Work is all in range  Cholesterol very well-controlled  Again encouraged to quit drinking  Encouraged to use CPAP  Under care of cardiologist for coronary artery disease and vascular follow-up  AAA  Medications refilled  Diet and exercise  Follow-up in 6 months    11/12/2024  Medicare wellness exam done  Mini-Mental status exam recall 2 out of 3 serial 7 perfect spelling backwards perfect patient does have living will and power of   Hearing slightly compromised hearing test ordered  Discussed memory exercises to help with short-term memory loss patient has some very mild memory issues

## 2024-12-27 NOTE — PROGRESS NOTES
History Of Present Illness  Garrick Hackett is a 79 y.o. male presenting  for a virtual visit.  The patient was [facility] his [facility]  Patient has a history of EVAR + endofixation via open groin exposure secondary to abdominal aortic aneurysm in 2017.  Patient had surveillance duplex and is here to discuss the results.  Denies any abdominal pain or back pain related to the aorta.     Past Medical History  He has a past medical history of Personal history of other endocrine, nutritional and metabolic disease (06/12/2020).    Surgical History  He has a past surgical history that includes Hernia repair (05/17/2013); Knee surgery (05/17/2013); Knee surgery (05/17/2013); CT angio abdomen pelvis w and or wo IV IV contrast (1/16/2018); CT angio abdomen pelvis w and or wo IV IV contrast (6/6/2022); CT angio abdomen pelvis w and or wo IV IV contrast (10/10/2017); and IR angiogram aorta abdomen (11/29/2017).     Social History  He reports that he has never smoked. He has never used smokeless tobacco. He reports that he does not drink alcohol and does not use drugs.    Family History  Family History   Problem Relation Name Age of Onset    Diabetes Mother      Diabetes Father      Liver cancer Brother          Allergies  Patient has no known allergies.    Review of Systems    CONSTITUTIONAL: Denies weight loss, fever and chills.    HEENT: Denies changes in vision and hearing.    RESPIRATORY: Denies SOB and cough.    CV: Denies palpitations and CP.    GI: Denies abdominal pain, nausea, vomiting and diarrhea.    : Denies dysuria and urinary frequency.    MSK: Denies myalgia and joint pain.    SKIN: Denies rash and pruritus.    VASC: Denies claudication, ischemic rest pain, or open wounds or sores    NEUROLOGICAL: Denies headache and syncope.    PSYCHIATRIC: Denies recent changes in mood. Denies anxiety and depression.     Physical Exam    No physical examination would be completed as this was  a virtual visit     Last Recorded Vitals  There were no vitals taken for this visit.    Relevant Results  CONCLUSIONS:  Aorta/Common Iliac Arteries/IVC: The aortic endograft with bilateral iliac limbs appear to be patent throughout. There does not appear to be evidence of endograft leak. The residual sac measures 3.39cm x 3.51cmin the mid segment. Technically challenging exam due to body habitus and overlying bowel gas.     Comparison:  Compared with study from 7/14/2021, no significant change.     Imaging & Doppler Findings:      AORTA     AP    Lateral    PSV  Proximal 2.66 cm         54.2 cm/s    Mid    3.39 cm 3.51 cm 103.5 cm/s                       Right    Left   Prox Endograft  43 cm/s 104 cm/s   Mid Endograft   54 cm/s 89 cm/s  Distal Endograft 65 cm/s 104 cm/s        Assessment/Plan   History of abdominal aortic aneurysm  Status post repair in 2017    I reviewed the duplex independently which showed aortic endograft with bilateral iliac limbs appears to be patent.  No evidence of endoleak although this was a technically challenging study due to overlying bowel gas.  There was no significant change compared to the study from 2021.  Plan for follow-up in 1 year with an aortic duplex.  Continue aspirin and atorvastatin.         Angelo Arriaga, APRN-CNP

## 2025-01-20 ENCOUNTER — APPOINTMENT (OUTPATIENT)
Dept: CARDIOLOGY | Facility: CLINIC | Age: 80
End: 2025-01-20
Payer: MEDICARE

## 2025-01-20 ENCOUNTER — HOSPITAL ENCOUNTER (OUTPATIENT)
Dept: CARDIOLOGY | Facility: CLINIC | Age: 80
Discharge: HOME | End: 2025-01-20
Payer: MEDICARE

## 2025-01-20 VITALS
BODY MASS INDEX: 42.36 KG/M2 | HEART RATE: 78 BPM | WEIGHT: 269.9 LBS | HEIGHT: 67 IN | DIASTOLIC BLOOD PRESSURE: 71 MMHG | OXYGEN SATURATION: 100 % | SYSTOLIC BLOOD PRESSURE: 134 MMHG

## 2025-01-20 DIAGNOSIS — R94.31 ABNORMAL ELECTROCARDIOGRAM (ECG) (EKG): ICD-10-CM

## 2025-01-20 DIAGNOSIS — I71.40 ABDOMINAL AORTIC ANEURYSM (AAA) WITHOUT RUPTURE, UNSPECIFIED PART (CMS-HCC): Primary | ICD-10-CM

## 2025-01-20 DIAGNOSIS — I25.10 CORONARY ARTERY DISEASE INVOLVING NATIVE HEART WITHOUT ANGINA PECTORIS, UNSPECIFIED VESSEL OR LESION TYPE: ICD-10-CM

## 2025-01-20 DIAGNOSIS — I50.9 CONGESTIVE HEART FAILURE, UNSPECIFIED HF CHRONICITY, UNSPECIFIED HEART FAILURE TYPE: ICD-10-CM

## 2025-01-20 LAB
AORTIC VALVE MEAN GRADIENT: 12 MMHG
AORTIC VALVE PEAK VELOCITY: 2.35 M/S
AV PEAK GRADIENT: 22 MMHG
AVA (PEAK VEL): 2.07 CM2
AVA (VTI): 2.56 CM2
EJECTION FRACTION APICAL 4 CHAMBER: 60.6
EJECTION FRACTION: 45 %
LEFT ATRIUM VOLUME AREA LENGTH INDEX BSA: 41.4 ML/M2
LEFT VENTRICLE INTERNAL DIMENSION DIASTOLE: 3.99 CM (ref 3.5–6)
LEFT VENTRICULAR OUTFLOW TRACT DIAMETER: 2.49 CM
MITRAL VALVE E/A RATIO: 0.64
RIGHT VENTRICLE FREE WALL PEAK S': 22.46 CM/S
RIGHT VENTRICLE PEAK SYSTOLIC PRESSURE: 32.3 MMHG
TRICUSPID ANNULAR PLANE SYSTOLIC EXCURSION: 2.6 CM

## 2025-01-20 PROCEDURE — 1036F TOBACCO NON-USER: CPT | Performed by: NURSE PRACTITIONER

## 2025-01-20 PROCEDURE — 93306 TTE W/DOPPLER COMPLETE: CPT | Performed by: INTERNAL MEDICINE

## 2025-01-20 PROCEDURE — C8929 TTE W OR WO FOL WCON,DOPPLER: HCPCS

## 2025-01-20 PROCEDURE — 99214 OFFICE O/P EST MOD 30 MIN: CPT | Performed by: NURSE PRACTITIONER

## 2025-01-20 PROCEDURE — 3078F DIAST BP <80 MM HG: CPT | Performed by: NURSE PRACTITIONER

## 2025-01-20 PROCEDURE — 1159F MED LIST DOCD IN RCRD: CPT | Performed by: NURSE PRACTITIONER

## 2025-01-20 PROCEDURE — G2211 COMPLEX E/M VISIT ADD ON: HCPCS | Performed by: NURSE PRACTITIONER

## 2025-01-20 PROCEDURE — 1126F AMNT PAIN NOTED NONE PRSNT: CPT | Performed by: NURSE PRACTITIONER

## 2025-01-20 PROCEDURE — 3075F SYST BP GE 130 - 139MM HG: CPT | Performed by: NURSE PRACTITIONER

## 2025-01-20 PROCEDURE — 1160F RVW MEDS BY RX/DR IN RCRD: CPT | Performed by: NURSE PRACTITIONER

## 2025-01-20 PROCEDURE — 2500000004 HC RX 250 GENERAL PHARMACY W/ HCPCS (ALT 636 FOR OP/ED): Performed by: NURSE PRACTITIONER

## 2025-01-20 RX ADMIN — PERFLUTREN 10 ML OF DILUTION: 6.52 INJECTION, SUSPENSION INTRAVENOUS at 15:41

## 2025-01-20 ASSESSMENT — ENCOUNTER SYMPTOMS
DEPRESSION: 0
LOSS OF SENSATION IN FEET: 1
NEUROLOGICAL NEGATIVE: 1
RESPIRATORY NEGATIVE: 1
CONSTITUTIONAL NEGATIVE: 1
GASTROINTESTINAL NEGATIVE: 1
MUSCULOSKELETAL NEGATIVE: 1
CARDIOVASCULAR NEGATIVE: 1
OCCASIONAL FEELINGS OF UNSTEADINESS: 0

## 2025-01-20 ASSESSMENT — PAIN SCALES - GENERAL: PAINLEVEL_OUTOF10: 0-NO PAIN

## 2025-01-20 ASSESSMENT — LIFESTYLE VARIABLES
HOW OFTEN DO YOU HAVE A DRINK CONTAINING ALCOHOL: 4 OR MORE TIMES A WEEK
HOW MANY STANDARD DRINKS CONTAINING ALCOHOL DO YOU HAVE ON A TYPICAL DAY: 3 OR 4
HOW OFTEN DO YOU HAVE SIX OR MORE DRINKS ON ONE OCCASION: LESS THAN MONTHLY
AUDIT-C TOTAL SCORE: 6
SKIP TO QUESTIONS 9-10: 0

## 2025-01-20 ASSESSMENT — PATIENT HEALTH QUESTIONNAIRE - PHQ9
SUM OF ALL RESPONSES TO PHQ9 QUESTIONS 1 & 2: 0
1. LITTLE INTEREST OR PLEASURE IN DOING THINGS: NOT AT ALL
2. FEELING DOWN, DEPRESSED OR HOPELESS: NOT AT ALL

## 2025-01-20 NOTE — PROGRESS NOTES
"Chief Complaint:   Follow-up (SOB with activity but not more than usual.  Denies Chest pain.  C/o RSV injection site on left arm.)    History Of Present Illness:    .Mr Hackett returns in follow up.  Denies chest pain, sob, palpitations or pedal edema. Will have an echo done today.              Last Recorded Vitals:  Blood pressure 134/71, pulse 78, height 1.702 m (5' 7\"), weight 122 kg (269 lb 14.4 oz), SpO2 100%.     Past Medical History:  Past Medical History:   Diagnosis Date    Personal history of other endocrine, nutritional and metabolic disease 06/12/2020    History of morbid obesity        Past Surgical History:  Past Surgical History:   Procedure Laterality Date    CT ABDOMEN PELVIS ANGIOGRAM W AND/OR WO IV CONTRAST  1/16/2018    CT ABDOMEN PELVIS ANGIOGRAM W AND/OR WO IV CONTRAST 1/16/2018 [facility]    CT ABDOMEN PELVIS ANGIOGRAM W AND/OR WO IV CONTRAST  6/6/2022    CT ABDOMEN PELVIS ANGIOGRAM W AND/OR WO IV CONTRAST 6/6/2022 [facility]    CT ABDOMEN PELVIS ANGIOGRAM W AND/OR WO IV CONTRAST  10/10/2017    CT ABDOMEN PELVIS ANGIOGRAM W AND/OR WO IV CONTRAST 10/10/2017 [facility]    HERNIA REPAIR  05/17/2013    Hernia Repair    IR ANGIOGRAM AORTA ABDOMEN  11/29/2017    IR ANGIOGRAM AORTA ABDOMEN 11/29/2017 [facility]    KNEE SURGERY  05/17/2013    Knee Surgery Left    KNEE SURGERY  05/17/2013    Knee Surgery Right       Social History:  Social History     Socioeconomic History    Marital status:    Tobacco Use    Smoking status: Never    Smokeless tobacco: Never   Substance and Sexual Activity    Alcohol use: Never    Drug use: Never       Family History:  Family History   Problem Relation Name Age of Onset    Diabetes Mother      Diabetes Father      Liver cancer Brother           Allergies:  Patient has no known allergies.    Outpatient Medications:  Current Outpatient Medications   Medication Sig Dispense Refill    amLODIPine (Norvasc) 5 mg tablet Take 1 tablet (5 " mg) by mouth once daily. 90 tablet 1    aspirin 81 mg EC tablet Take 1 tablet (81 mg) by mouth once daily.      atorvastatin (Lipitor) 40 mg tablet Take 1 tablet (40 mg) by mouth once daily. 90 tablet 1    ferrous sulfate (Iron, ferrous sulfate,) 325 (65 Fe) MG tablet Take 1 tablet (325 mg) by mouth 3 times a week.      lisinopril 20 mg tablet Take 1 tablet (20 mg) by mouth once daily. 90 tablet 1    metoprolol succinate XL (Toprol-XL) 100 mg 24 hr tablet Take 1 tablet (100 mg) by mouth once daily. 90 tablet 1    multivitamin with minerals iron-free (CertaVite Senior) Take 1 tablet by mouth once daily.      sildenafil (Viagra) 50 mg tablet Take 1 tablet (50 mg) by mouth if needed. Take APPROXIMATELY 1 HOUR BEFORE SEXUAL ACTIVITY       No current facility-administered medications for this visit.        Physical Exam:  Cardiovascular:      PMI at left midclavicular line. Normal rate. Regular rhythm. Normal S1. Normal S2.       Murmurs: There is no murmur.      No gallop.  No click. No rub.   Pulses:     Intact distal pulses.   Edema:     Peripheral edema absent.         ROS:  Review of Systems   Constitutional: Negative.   Cardiovascular: Negative.    Respiratory: Negative.     Skin: Negative.    Musculoskeletal: Negative.    Gastrointestinal: Negative.    Genitourinary: Negative.    Neurological: Negative.           Last Labs:  CBC -  Lab Results   Component Value Date    WBC 7.4 09/12/2024    HGB 14.1 09/12/2024    HCT 41.0 09/12/2024    MCV 91 09/12/2024     09/12/2024       CMP -  Lab Results   Component Value Date    CALCIUM 9.6 09/12/2024    PHOS 4.5 12/16/2017    PROT 6.9 09/12/2024    ALBUMIN 4.3 09/12/2024    AST 17 09/12/2024    ALT 20 09/12/2024    ALKPHOS 106 09/12/2024    BILITOT 0.9 09/12/2024       LIPID PANEL -   Lab Results   Component Value Date    CHOL 157 09/12/2024    TRIG 144 09/12/2024    HDL 64.8 09/12/2024    CHHDL 2.4 09/12/2024    LDLF 49 12/06/2022    VLDL 29 09/12/2024    NHDL 92  09/12/2024       RENAL FUNCTION PANEL -   Lab Results   Component Value Date    GLUCOSE 89 09/12/2024     09/12/2024    K 5.1 09/12/2024     09/12/2024    CO2 25 09/12/2024    ANIONGAP 16 09/12/2024    BUN 19 09/12/2024    CREATININE 0.95 09/12/2024    GFRMALE 72 12/06/2022    CALCIUM 9.6 09/12/2024    PHOS 4.5 12/16/2017    ALBUMIN 4.3 09/12/2024        Lab Results   Component Value Date    BNP 27 12/14/2017    HGBA1C 5.2 03/18/2024         Assessment/Plan   Problem List Items Addressed This Visit    None      Assessment:     1. Coronary artery disease status post PCI and drug-eluting stent deployment to high-grade LAD stenosis 12/14/2017. This patient is a white male who does have multiple risk factors for coronary artery disease including hypertension, hyperlipidemia, type 2 diabetes, and former smoking. The patient recently was identified as having an abdominal aortic aneurysm and as such was scheduled for noninvasive cardiac testing in preparation for presumptive abdominal aortic aneurysm repair. 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 The patient had an echocardiogram performed at [facility] near that time on 05/28/2014 which estimated the LV ejection fraction in the upper 50% range with no regional wall motion abnormalities along with moderate aortic valve regurgitation and trace to mild tricuspid valve regurgitation. Recently the patient had a echocardiogram on 08/28/2017 with similar results with respect to an estimated ejection fraction in the upper 50% range with no regional wall motion abnormalities. There is again moderate aortic valve insufficiency and severe pulmonic insufficiency. The patient did have a nuclear stress test however on 09/01/2017 which demonstrated again scarring of the anteroseptal wall including the apex consistent with a prior anteroseptal wall MI  consistent with the EKG findings even though his 2 echocardiograms failed to demonstrate a wall motion abnormality. The patient presumably had a clinically silent infarct possibly related to his type 2 diabetes. The patient did undergo diagnostic coronary angiography prior to having his aneurysm repaired as part of preoperative clearance. This was performed on 10/10/2017 and it demonstrated a 70% stenosis of the late proximal LAD just before the diagonal branch along with a complete 100% occlusion of the mid portion of the RCA and a 40% stenosis of the LCx. The patient was thought to require PCI but this was delayed to be an elective procedure following the repair of his abdominal aortic aneurysm. The patient subsequently returned on 12/14/2017 and underwent PCI and drug-eluting stent deployment to the LAD with loss of the jailed first diagonal branch. The patient had an echocardiogram performed on 12/15/2017 following that procedure which showed an estimated LV ejection fraction at 45â€“50 percent with segmental regional wall motion abnormalities. This included akinesis of the apical septal segment and hypokinesis of the mid inferoseptal, apical lateral, apical anterior, and apex segments. There was aortic valve sclerosis with mild 1+ aortic valve insufficiency and moderate dilatation of the ascending thoracic aorta. The patient also had a pharmacological nuclear stress test on 01/29/2018 that showed fixed decreased perfusion involving the distal anteroseptal wall consistent with scar and an LV ejection fraction of 44%. The patient has been feeling great and his resume fairly normal activities including golfing. He is checking his temperature daily because of the corona virus. He will be allowed to discontinue Plavix at this point in time but remain on daily aspirin. Echo done 11/2021 showed EF 55-60%, mild to moderate TR, mild AS and AI, moderate PI, PASP 35 mm Hg. Pharmacologic nuclear stress test done 12/2023  was negative for ischemia.      2. Abdominal aortic aneurysm status post endograft repair 11/29/2017. This patient did have a abdominal ultrasound on 08/18/2017 demonstrating a 5.2-5.3 cm abdominal aortic aneurysm along with dilatation of the bilateral iliac arteries. The patient underwent endograft replacement of the abdominal aortic aneurysm performed on 11/29/2017 at [facility] which was well tolerated. Patient did have a repeat abdominal ultrasound performed on 02/19/2020 which was technically difficult. US done 07/2021 had no significant change.  Repeat US done 09/2023.  US 09/2024 with no significant change.       3. Moderate aortic valve insufficiency.     4. Hyperlipidemia. Reasonable response to atorvastatin 40 mg daily. Lipid panel on 12/2022 included cholesterol 133 LDL 49 HDL 65 triglyceride 94.      5. Hypertension. The patient will be switched from Dyazide to metoprolol  mg daily.     6. Type 2 diabetes.     7. Former smoking. This patient discontinued smoking following the death of his wife in 2015 from sarcoma.     8. Status post bilateral arthroscopic knee surgery.     9. Status post left inguinal hernia repair Ã--2 in the 1970s     10. Carotid vascular disease. The patient did have a repeat carotid ultrasound on 02/19/2020 showing a 50-69% stenosis right internal carotid artery and less than 50% stenosis of left internal carotid artery. Repeat carotid US done 01/2022 showed > 70% stenosis to the right and < 50% to the left. Had right carotid endarterectomy with Dr Davies at [place] 02/13/2023. Had an US done 09/2023 and again 02/2024.  Sees vascular.  US from 09/2024 with no significant change.     11. Obstructive sleep apnea. The patient did have a formal sleep study performed on 10/06/2017.     12. Hx of covid-19 vaccine #1 and #2.           Nay Quezada, APRN-CNP

## 2025-04-21 DIAGNOSIS — I10 PRIMARY HYPERTENSION: ICD-10-CM

## 2025-04-21 DIAGNOSIS — N52.9 ERECTILE DYSFUNCTION, UNSPECIFIED ERECTILE DYSFUNCTION TYPE: ICD-10-CM

## 2025-04-21 DIAGNOSIS — Z13.29 THYROID DISORDER SCREEN: ICD-10-CM

## 2025-04-21 DIAGNOSIS — E78.00 PURE HYPERCHOLESTEROLEMIA: ICD-10-CM

## 2025-04-21 DIAGNOSIS — E78.2 MIXED HYPERLIPIDEMIA: ICD-10-CM

## 2025-04-21 DIAGNOSIS — Z12.5 SCREENING PSA (PROSTATE SPECIFIC ANTIGEN): ICD-10-CM

## 2025-04-21 RX ORDER — METOPROLOL SUCCINATE 100 MG/1
100 TABLET, EXTENDED RELEASE ORAL DAILY
Qty: 30 TABLET | Refills: 0 | Status: SHIPPED | OUTPATIENT
Start: 2025-04-21 | End: 2025-04-29 | Stop reason: SDUPTHER

## 2025-04-21 RX ORDER — AMLODIPINE BESYLATE 5 MG/1
5 TABLET ORAL DAILY
Qty: 30 TABLET | Refills: 0 | Status: SHIPPED | OUTPATIENT
Start: 2025-04-21 | End: 2025-04-29 | Stop reason: SDUPTHER

## 2025-04-21 RX ORDER — ATORVASTATIN CALCIUM 40 MG/1
40 TABLET, FILM COATED ORAL DAILY
Qty: 30 TABLET | Refills: 0 | Status: SHIPPED | OUTPATIENT
Start: 2025-04-21 | End: 2025-04-29 | Stop reason: SDUPTHER

## 2025-04-21 RX ORDER — LISINOPRIL 20 MG/1
20 TABLET ORAL DAILY
Qty: 30 TABLET | Refills: 0 | Status: SHIPPED | OUTPATIENT
Start: 2025-04-21 | End: 2025-04-29 | Stop reason: SDUPTHER

## 2025-04-23 LAB
ALBUMIN SERPL-MCNC: 4 G/DL (ref 3.6–5.1)
ALP SERPL-CCNC: 104 U/L (ref 35–144)
ALT SERPL-CCNC: 15 U/L (ref 9–46)
ANION GAP SERPL CALCULATED.4IONS-SCNC: 10 MMOL/L (CALC) (ref 7–17)
AST SERPL-CCNC: 18 U/L (ref 10–35)
BILIRUB SERPL-MCNC: 0.9 MG/DL (ref 0.2–1.2)
BUN SERPL-MCNC: 13 MG/DL (ref 7–25)
CALCIUM SERPL-MCNC: 9.2 MG/DL (ref 8.6–10.3)
CHLORIDE SERPL-SCNC: 101 MMOL/L (ref 98–110)
CHOLEST SERPL-MCNC: 159 MG/DL
CHOLEST/HDLC SERPL: 2.4 (CALC)
CO2 SERPL-SCNC: 25 MMOL/L (ref 20–32)
CREAT SERPL-MCNC: 0.93 MG/DL (ref 0.7–1.28)
EGFRCR SERPLBLD CKD-EPI 2021: 84 ML/MIN/1.73M2
ERYTHROCYTE [DISTWIDTH] IN BLOOD BY AUTOMATED COUNT: 13.9 % (ref 11–15)
GLUCOSE SERPL-MCNC: 95 MG/DL (ref 65–99)
HCT VFR BLD AUTO: 41.4 % (ref 38.5–50)
HDLC SERPL-MCNC: 67 MG/DL
HGB BLD-MCNC: 13.7 G/DL (ref 13.2–17.1)
LDLC SERPL CALC-MCNC: 70 MG/DL (CALC)
MCH RBC QN AUTO: 30.9 PG (ref 27–33)
MCHC RBC AUTO-ENTMCNC: 33.1 G/DL (ref 32–36)
MCV RBC AUTO: 93.2 FL (ref 80–100)
NONHDLC SERPL-MCNC: 92 MG/DL (CALC)
PLATELET # BLD AUTO: 207 THOUSAND/UL (ref 140–400)
PMV BLD REES-ECKER: 9.2 FL (ref 7.5–12.5)
POTASSIUM SERPL-SCNC: 5 MMOL/L (ref 3.5–5.3)
PROT SERPL-MCNC: 6.7 G/DL (ref 6.1–8.1)
PSA SERPL-MCNC: 0.56 NG/ML
RBC # BLD AUTO: 4.44 MILLION/UL (ref 4.2–5.8)
SODIUM SERPL-SCNC: 136 MMOL/L (ref 135–146)
TRIGL SERPL-MCNC: 139 MG/DL
TSH SERPL-ACNC: 1.24 MIU/L (ref 0.4–4.5)
WBC # BLD AUTO: 7.3 THOUSAND/UL (ref 3.8–10.8)

## 2025-04-29 ENCOUNTER — APPOINTMENT (OUTPATIENT)
Dept: PRIMARY CARE | Facility: CLINIC | Age: 80
End: 2025-04-29
Payer: MEDICARE

## 2025-04-29 VITALS
HEIGHT: 67 IN | SYSTOLIC BLOOD PRESSURE: 132 MMHG | WEIGHT: 272 LBS | BODY MASS INDEX: 42.69 KG/M2 | DIASTOLIC BLOOD PRESSURE: 74 MMHG

## 2025-04-29 DIAGNOSIS — E78.2 MIXED HYPERLIPIDEMIA: ICD-10-CM

## 2025-04-29 DIAGNOSIS — I25.10 CORONARY ARTERY DISEASE INVOLVING NATIVE HEART WITHOUT ANGINA PECTORIS, UNSPECIFIED VESSEL OR LESION TYPE: Primary | ICD-10-CM

## 2025-04-29 DIAGNOSIS — E66.01 MORBID (SEVERE) OBESITY DUE TO EXCESS CALORIES (MULTI): ICD-10-CM

## 2025-04-29 DIAGNOSIS — G45.1 CAROTID ARTERY OCCLUSION SYNDROME: ICD-10-CM

## 2025-04-29 DIAGNOSIS — I10 PRIMARY HYPERTENSION: ICD-10-CM

## 2025-04-29 PROCEDURE — 3078F DIAST BP <80 MM HG: CPT | Performed by: INTERNAL MEDICINE

## 2025-04-29 PROCEDURE — 1159F MED LIST DOCD IN RCRD: CPT | Performed by: INTERNAL MEDICINE

## 2025-04-29 PROCEDURE — 1036F TOBACCO NON-USER: CPT | Performed by: INTERNAL MEDICINE

## 2025-04-29 PROCEDURE — 3075F SYST BP GE 130 - 139MM HG: CPT | Performed by: INTERNAL MEDICINE

## 2025-04-29 PROCEDURE — 99214 OFFICE O/P EST MOD 30 MIN: CPT | Performed by: INTERNAL MEDICINE

## 2025-04-29 RX ORDER — METOPROLOL SUCCINATE 100 MG/1
100 TABLET, EXTENDED RELEASE ORAL DAILY
Qty: 90 TABLET | Refills: 1 | Status: SHIPPED | OUTPATIENT
Start: 2025-04-29

## 2025-04-29 RX ORDER — AMLODIPINE BESYLATE 5 MG/1
5 TABLET ORAL DAILY
Qty: 90 TABLET | Refills: 1 | Status: SHIPPED | OUTPATIENT
Start: 2025-04-29

## 2025-04-29 RX ORDER — ATORVASTATIN CALCIUM 40 MG/1
40 TABLET, FILM COATED ORAL DAILY
Qty: 90 TABLET | Refills: 1 | Status: SHIPPED | OUTPATIENT
Start: 2025-04-29

## 2025-04-29 RX ORDER — LISINOPRIL 20 MG/1
20 TABLET ORAL DAILY
Qty: 90 TABLET | Refills: 1 | Status: SHIPPED | OUTPATIENT
Start: 2025-04-29

## 2025-04-29 NOTE — PROGRESS NOTES
Subjective   Patient ID: Garrick Hackett is a 79 y.o. male who presents for Follow-up.    HPI   Patient is here for follow-up  Follow-up on hypertension, high cholesterol, coronary artery disease  Overall doing well has gained some weight  He is not using CPAP consistently     Past recap   patient here for Medicare annual wellness exam   patient is here for follow-up  Follow-up on hypertension high cholesterol neuropathy and peripheral vascular disease with coronary artery disease  Overall he is stable   Trying to eat better    Patient is here for follow-up  Wants handicap placard  EMG of both legs done  Follow-up on hypertension high cholesterol high blood sugar  Continues to drink alcohol on regular basis     Past recap   patient is here for preop clearance.  Going for bilateral blepharoplasty for ptosis in both eyelids  Patient still drinks 4 drinks per day  He does have severe motor or sensory quality neuropathy on the EMG  No chest pain no shortness of breath.  Recent cardiac follow-up stable     patient is here for follow-up  Complaining of numbness and tingling in both feet  Went to see the cardiologist separation the leg was fine  Not able to make appointment with the vascular yet  Follow-up on hypertension high cholesterol coronary artery disease    patient here for follow-up  Did blood work  Needs medication refill  Using CPAP occasionally not very noncompliant  Drinking alcohol every day  Doing the chair volleyball but not much activity  Follow-up on hypertension high cholesterol coronary artery disease  Had colonoscopy found to have tubular adenoma due for colonoscopy in 3 years again  Not able to lose weight at all     Patient got CPAP machine  He is using every day for 5-6 hours  He only missed using it when he was at [employer] while on his way to [place]  IHe does feel that CPAP is helping his sleep and he is feeling less tired  Follow-up on blood work  Follow-up on blood pressure cholesterol and aneurysm  "Had a follow-up with cardiologist for his coronary artery disease  He was drinking quite a bit during holiday season  Has gained weight hyponatremia     Patient is here for follow-up and still not able to lose weight   Second stent not Placed not sure if he needs to continue on Plavix  Patient did the sleep study but did not get the CPAP machine records his insurance did not cover the machine  Follow-up on high cholesterol hypertension elevated blood sugar     Still drinking moderately 6 beers a day  Trying to watch diet  Review of Systems    Objective   /74   Ht 1.702 m (5' 7\")   Wt 123 kg (272 lb)   BMI 42.60 kg/m²     Physical Exam  Vitals reviewed.   Constitutional:       Appearance: Normal appearance. He is obese.   HENT:      Head: Normocephalic and atraumatic.      Right Ear: Tympanic membrane, ear canal and external ear normal.      Left Ear: Tympanic membrane, ear canal and external ear normal.      Nose: Nose normal. No congestion.      Mouth/Throat:      Pharynx: Oropharynx is clear.   Eyes:      Extraocular Movements: Extraocular movements intact.      Conjunctiva/sclera: Conjunctivae normal.      Pupils: Pupils are equal, round, and reactive to light.   Cardiovascular:      Rate and Rhythm: Normal rate and regular rhythm.      Pulses: Normal pulses.      Heart sounds: Normal heart sounds.   Pulmonary:      Effort: Pulmonary effort is normal.      Breath sounds: Normal breath sounds.   Abdominal:      General: Abdomen is flat. Bowel sounds are normal.      Palpations: Abdomen is soft.   Musculoskeletal:      Cervical back: Normal range of motion and neck supple.   Skin:     General: Skin is warm and dry.   Neurological:      General: No focal deficit present.      Mental Status: He is alert and oriented to person, place, and time.   Psychiatric:         Mood and Affect: Mood normal.         Assessment/Plan   Problem List Items Addressed This Visit           ICD-10-CM       Cardiac and " Vasculature    CAD (coronary artery disease) - Primary I25.10    Relevant Medications    metoprolol succinate XL (Toprol-XL) 100 mg 24 hr tablet    amLODIPine (Norvasc) 5 mg tablet    Carotid artery occlusion syndrome G45.1    Hyperlipidemia E78.5    Relevant Medications    atorvastatin (Lipitor) 40 mg tablet    lisinopril 20 mg tablet    metoprolol succinate XL (Toprol-XL) 100 mg 24 hr tablet    amLODIPine (Norvasc) 5 mg tablet    Other Relevant Orders    Lipid Panel    Hypertension I10    Relevant Medications    lisinopril 20 mg tablet    metoprolol succinate XL (Toprol-XL) 100 mg 24 hr tablet    amLODIPine (Norvasc) 5 mg tablet    Other Relevant Orders    CBC    Comprehensive Metabolic Panel       Endocrine/Metabolic    Morbid (severe) obesity due to excess calories (Multi) E66.01    past recap  Medicare wellness exam done  Blood pressure stable  Colonoscopy results reviewed  Continue CPAP use for sleep apnea  Blood work results reviewed  Diabetes under control  Cholesterol under control  Blood pressure under control  COPD stable  Follow-up in 6 months  Again encouraged to quit drinking lose weight exercise  Patient agreed to see the cardiologist and vascular surgeon  Reference given       10/9/2023  Numbness and tingling in the feet probably not related to vascular  Pulses are palpable  Most likely neuropathy  We will check EMG test on both legs  We will check vitamin D  Blood pressures stable  Blood work reviewed everything in range  PSA is normal   Vitamin D level low take supplements    10/25/2023  EKG done shows normal sinus rhythm  Blood pressure is stable  Patient clinically stable for low impact surgery  Encourage patient to not drink    12/13/2023  EMG test shows axonal sensory motor see 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  Most likely related to alcohol  Again encourage patient to quit drinking  Take vitamin B12  Handicap sticker given  Blood pressure stable  Cholesterol okay  Due  for blood work  Medications refilled  Follow-up in 3 months    3/25/2024  Blood work reviewed  Diabetes under control cholesterol under control  Blood pressure under control  Again encouraged to quit drinking  Encouraged to use CPAP every day  Again encouraged to lose weight  He is under care of cardiologist  He is under care of vascular for AAA  Routine follow-up in 6 months    9/23/2024  He has gained few pounds   blood pressure stable  Work is all in range  Cholesterol very well-controlled  Again encouraged to quit drinking  Encouraged to use CPAP  Under care of cardiologist for coronary artery disease and vascular follow-up AAA  Medications refilled  Diet and exercise  Follow-up in 6 months    11/12/2024  Medicare wellness exam done  Mini-Mental status exam recall 2 out of 3 serial 7 perfect spelling backwards perfect patient does have living will and power of   Hearing slightly compromised hearing test ordered  Discussed memory exercises to help with short-term memory loss patient has some very mild memory issues    4/29/2025  Blood work reviewed  All blood work in range  Blood pressure stable  Encouraged to use CPAP machine  Encouraged to lose weight  Follow-up blood work in 6 months

## 2025-07-21 ENCOUNTER — APPOINTMENT (OUTPATIENT)
Dept: CARDIOLOGY | Facility: CLINIC | Age: 80
End: 2025-07-21
Payer: MEDICARE

## 2025-07-21 VITALS
OXYGEN SATURATION: 94 % | WEIGHT: 269.9 LBS | SYSTOLIC BLOOD PRESSURE: 125 MMHG | HEIGHT: 67 IN | BODY MASS INDEX: 42.36 KG/M2 | HEART RATE: 76 BPM | DIASTOLIC BLOOD PRESSURE: 79 MMHG

## 2025-07-21 DIAGNOSIS — E78.2 MIXED HYPERLIPIDEMIA: ICD-10-CM

## 2025-07-21 DIAGNOSIS — I10 BENIGN ESSENTIAL HYPERTENSION: Primary | ICD-10-CM

## 2025-07-21 PROCEDURE — G2211 COMPLEX E/M VISIT ADD ON: HCPCS | Performed by: NURSE PRACTITIONER

## 2025-07-21 PROCEDURE — 3078F DIAST BP <80 MM HG: CPT | Performed by: NURSE PRACTITIONER

## 2025-07-21 PROCEDURE — 99212 OFFICE O/P EST SF 10 MIN: CPT

## 2025-07-21 PROCEDURE — 99214 OFFICE O/P EST MOD 30 MIN: CPT | Performed by: NURSE PRACTITIONER

## 2025-07-21 PROCEDURE — 1036F TOBACCO NON-USER: CPT | Performed by: NURSE PRACTITIONER

## 2025-07-21 PROCEDURE — 1126F AMNT PAIN NOTED NONE PRSNT: CPT | Performed by: NURSE PRACTITIONER

## 2025-07-21 PROCEDURE — 1159F MED LIST DOCD IN RCRD: CPT | Performed by: NURSE PRACTITIONER

## 2025-07-21 PROCEDURE — 1160F RVW MEDS BY RX/DR IN RCRD: CPT | Performed by: NURSE PRACTITIONER

## 2025-07-21 PROCEDURE — 3074F SYST BP LT 130 MM HG: CPT | Performed by: NURSE PRACTITIONER

## 2025-07-21 ASSESSMENT — ENCOUNTER SYMPTOMS
GASTROINTESTINAL NEGATIVE: 1
CONSTITUTIONAL NEGATIVE: 1
CARDIOVASCULAR NEGATIVE: 1
RESPIRATORY NEGATIVE: 1
MUSCULOSKELETAL NEGATIVE: 1
NEUROLOGICAL NEGATIVE: 1

## 2025-07-21 ASSESSMENT — PATIENT HEALTH QUESTIONNAIRE - PHQ9
2. FEELING DOWN, DEPRESSED OR HOPELESS: NOT AT ALL
SUM OF ALL RESPONSES TO PHQ9 QUESTIONS 1 AND 2: 0
1. LITTLE INTEREST OR PLEASURE IN DOING THINGS: NOT AT ALL

## 2025-07-21 ASSESSMENT — COLUMBIA-SUICIDE SEVERITY RATING SCALE - C-SSRS
6. HAVE YOU EVER DONE ANYTHING, STARTED TO DO ANYTHING, OR PREPARED TO DO ANYTHING TO END YOUR LIFE?: NO
2. HAVE YOU ACTUALLY HAD ANY THOUGHTS OF KILLING YOURSELF?: NO
1. IN THE PAST MONTH, HAVE YOU WISHED YOU WERE DEAD OR WISHED YOU COULD GO TO SLEEP AND NOT WAKE UP?: NO

## 2025-07-21 ASSESSMENT — PAIN SCALES - GENERAL: PAINLEVEL_OUTOF10: 0-NO PAIN

## 2025-07-21 NOTE — PROGRESS NOTES
"Chief Complaint:   Follow-up (Here for follow up visit.   Denies any chest discomfort or shortness of breath. )    History Of Present Illness:    .Mr Hackett returns in follow up.  Denies chest pain, sob, palpitations or pedal edema.               Last Recorded Vitals:  Blood pressure 125/79, pulse 76, height 1.702 m (5' 7\"), weight 122 kg (269 lb 14.4 oz), SpO2 94%.     Past Medical History:  Medical History[1]     Past Surgical History:  Surgical History[2]    Social History:  Social History[3]    Family History:  Family History[4]      Allergies:  Patient has no known allergies.    Outpatient Medications:  Current Medications[5]     Physical Exam:  Cardiovascular:      PMI at left midclavicular line. Normal rate. Regular rhythm. Normal S1. Normal S2.       Murmurs: There is no murmur.      No gallop.  No click. No rub.   Pulses:     Intact distal pulses.   Edema:     Peripheral edema absent.       ROS:  Review of Systems   Constitutional: Negative.   Cardiovascular: Negative.    Respiratory: Negative.     Skin: Negative.    Musculoskeletal: Negative.    Gastrointestinal: Negative.    Genitourinary: Negative.    Neurological: Negative.           Last Labs: tsh lipid cmp cbc reviewed 04/2025 A1c 03/2024   CBC -  Lab Results   Component Value Date    WBC 7.3 04/23/2025    HGB 13.7 04/23/2025    HCT 41.4 04/23/2025    MCV 93.2 04/23/2025     04/23/2025       CMP -  Lab Results   Component Value Date    CALCIUM 9.2 04/23/2025    PHOS 4.5 12/16/2017    PROT 6.7 04/23/2025    ALBUMIN 4.0 04/23/2025    AST 18 04/23/2025    ALT 15 04/23/2025    ALKPHOS 104 04/23/2025    BILITOT 0.9 04/23/2025       LIPID PANEL -   Lab Results   Component Value Date    CHOL 159 04/23/2025    TRIG 139 04/23/2025    HDL 67 04/23/2025    CHHDL 2.4 04/23/2025    LDLF 49 12/06/2022    VLDL 29 09/12/2024    NHDL 92 04/23/2025       RENAL FUNCTION PANEL -   Lab Results   Component Value Date    GLUCOSE 95 04/23/2025     04/23/2025    " K 5.0 04/23/2025     04/23/2025    CO2 25 04/23/2025    ANIONGAP 10 04/23/2025    BUN 13 04/23/2025    CREATININE 0.93 04/23/2025    GFRMALE 72 12/06/2022    CALCIUM 9.2 04/23/2025    PHOS 4.5 12/16/2017    ALBUMIN 4.0 04/23/2025        Lab Results   Component Value Date    BNP 27 12/14/2017    HGBA1C 5.2 03/18/2024         Assessment/Plan   Problem List Items Addressed This Visit    None    Assessment:     1. Coronary artery disease status post PCI and drug-eluting stent deployment to high-grade LAD stenosis 12/14/2017. This patient is a white male who does have multiple risk factors for coronary artery disease including hypertension, hyperlipidemia, type 2 diabetes, and former smoking. The patient recently was identified as having an abdominal aortic aneurysm and as such was scheduled for noninvasive cardiac testing in preparation for presumptive abdominal aortic aneurysm repair. 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 The patient had an echocardiogram performed at [facility] near that time on 05/28/2014 which estimated the LV ejection fraction in the upper 50% range with no regional wall motion abnormalities along with moderate aortic valve regurgitation and trace to mild tricuspid valve regurgitation. Recently the patient had a echocardiogram on 08/28/2017 with similar results with respect to an estimated ejection fraction in the upper 50% range with no regional wall motion abnormalities. There is again moderate aortic valve insufficiency and severe pulmonic insufficiency. The patient did have a nuclear stress test however on 09/01/2017 which demonstrated again scarring of the anteroseptal wall including the apex consistent with a prior anteroseptal wall MI consistent with the EKG findings even though his 2 echocardiograms failed to demonstrate a wall motion abnormality. The patient  presumably had a clinically silent infarct possibly related to his type 2 diabetes. The patient did undergo diagnostic coronary angiography prior to having his aneurysm repaired as part of preoperative clearance. This was performed on 10/10/2017 and it demonstrated a 70% stenosis of the late proximal LAD just before the diagonal branch along with a complete 100% occlusion of the mid portion of the RCA and a 40% stenosis of the LCx. The patient was thought to require PCI but this was delayed to be an elective procedure following the repair of his abdominal aortic aneurysm. The patient subsequently returned on 12/14/2017 and underwent PCI and drug-eluting stent deployment to the LAD with loss of the jailed first diagonal branch. The patient had an echocardiogram performed on 12/15/2017 following that procedure which showed an estimated LV ejection fraction at 45â€“50 percent with segmental regional wall motion abnormalities. This included akinesis of the apical septal segment and hypokinesis of the mid inferoseptal, apical lateral, apical anterior, and apex segments. There was aortic valve sclerosis with mild 1+ aortic valve insufficiency and moderate dilatation of the ascending thoracic aorta. The patient also had a pharmacological nuclear stress test on 01/29/2018 that showed fixed decreased perfusion involving the distal anteroseptal wall consistent with scar and an LV ejection fraction of 44%. The patient has been feeling great and his resume fairly normal activities including golfing. He is checking his temperature daily because of the corona virus. He will be allowed to discontinue Plavix at this point in time but remain on daily aspirin. Echo done 11/2021 showed EF 55-60%, mild to moderate TR, mild AS and AI, moderate PI, PASP 35 mm Hg. Pharmacologic nuclear stress test done 12/2023 was negative for ischemia. Stable.      2. Abdominal aortic aneurysm status post endograft repair 11/29/2017. This patient did  have a abdominal ultrasound on 08/18/2017 demonstrating a 5.2-5.3 cm abdominal aortic aneurysm along with dilatation of the bilateral iliac arteries. The patient underwent endograft replacement of the abdominal aortic aneurysm performed on 11/29/2017 at [facility] which was well tolerated. Patient did have a repeat abdominal ultrasound performed on 02/19/2020 which was technically difficult. US done 07/2021 had no significant change.  Repeat US done 09/2023.  US 09/2024 with no significant change.   Continues to follow with vascular.      3. Moderate aortic valve insufficiency.  Echo 01/2025  CONCLUSIONS:   1. The left ventricular systolic function is mildly decreased, with a visually estimated ejection fraction of 45%.   2. Abnormal left venticular wall motion.   3. Spectral Doppler shows an abnormal pattern of left ventricular diastolic filling.   4. There is normal right ventricular global systolic function.   5. Trace to mild mitral valve regurgitation.   6. Mild aortic valve stenosis.   7. There is moderate calcification of the right coronary aortic valve cusp.   8. Mild aortic valve regurgitation.   9. There is moderate pulmonic valve regurgitation.  10. The peak instantaneous gradient of the aortic valve is 22 mm     4. Hyperlipidemia. Reasonable response to atorvastatin 40 mg daily. Lipid panel on 04/2025 included cholesterol 159 LDL 70 HDL 67 triglyceride 139. Hx of better results in the past.     5. Hypertension. The patient will be switched from Dyazide to metoprolol  mg daily.  Stable.     6. Type 2 diabetes. A1C from 03/2024.     7. Former smoking. This patient discontinued smoking following the death of his wife in 2015 from sarcoma. reviewed     8. Status post bilateral arthroscopic knee surgery. reviewed     9. Status post left inguinal hernia repair Ã--2 in the 1970s reviewed      10. Carotid vascular disease. The patient did have a repeat carotid ultrasound on 02/19/2020 showing a  50-69% stenosis right internal carotid artery and less than 50% stenosis of left internal carotid artery. Repeat carotid US done 01/2022 showed > 70% stenosis to the right and < 50% to the left. Had right carotid endarterectomy with Dr Davies at [place] 02/13/2023. Had an US done 09/2023 and again 02/2024.  Sees vascular.  US from 09/2024 with no significant change. reviewed     11. Obstructive sleep apnea. The patient did have a formal sleep study performed on 10/06/2017. reviewed     12. Hx of covid-19 vaccine #1 and #2.          Nay Quezada, APRN-CNP       [1]   Past Medical History:  Diagnosis Date    Personal history of other endocrine, nutritional and metabolic disease 06/12/2020    History of morbid obesity   [2]   Past Surgical History:  Procedure Laterality Date    CT ABDOMEN PELVIS ANGIOGRAM W AND/OR WO IV CONTRAST  1/16/2018    CT ABDOMEN PELVIS ANGIOGRAM W AND/OR WO IV CONTRAST 1/16/2018 [facility]    CT ABDOMEN PELVIS ANGIOGRAM W AND/OR WO IV CONTRAST  6/6/2022    CT ABDOMEN PELVIS ANGIOGRAM W AND/OR WO IV CONTRAST 6/6/2022 [facility]    CT ABDOMEN PELVIS ANGIOGRAM W AND/OR WO IV CONTRAST  10/10/2017    CT ABDOMEN PELVIS ANGIOGRAM W AND/OR WO IV CONTRAST 10/10/2017 [facility]    HERNIA REPAIR  05/17/2013    Hernia Repair    IR ANGIOGRAM AORTA ABDOMEN  11/29/2017    IR ANGIOGRAM AORTA ABDOMEN 11/29/2017 [facility]    KNEE SURGERY  05/17/2013    Knee Surgery Left    KNEE SURGERY  05/17/2013    Knee Surgery Right   [3]   Social History  Socioeconomic History    Marital status:    Tobacco Use    Smoking status: Never    Smokeless tobacco: Never   Substance and Sexual Activity    Alcohol use: Never    Drug use: Never   [4]   Family History  Problem Relation Name Age of Onset    Diabetes Mother      Diabetes Father      Liver cancer Brother     [5]   Current Outpatient Medications   Medication Sig Dispense Refill    amLODIPine (Norvasc) 5 mg tablet Take 1 tablet (5 mg)  by mouth once daily. 90 tablet 1    aspirin 81 mg EC tablet Take 1 tablet (81 mg) by mouth once daily.      atorvastatin (Lipitor) 40 mg tablet Take 1 tablet (40 mg) by mouth once daily. 90 tablet 1    lisinopril 20 mg tablet Take 1 tablet (20 mg) by mouth once daily. 90 tablet 1    metoprolol succinate XL (Toprol-XL) 100 mg 24 hr tablet Take 1 tablet (100 mg) by mouth once daily. 90 tablet 1    multivitamin with minerals iron-free (CertaVite Senior) Take 1 tablet by mouth once daily.      sildenafil (Viagra) 50 mg tablet Take 1 tablet (50 mg) by mouth if needed. Take APPROXIMATELY 1 HOUR BEFORE SEXUAL ACTIVITY      ferrous sulfate (Iron, ferrous sulfate,) 325 (65 Fe) MG tablet Take 1 tablet (325 mg) by mouth 3 times a week. (Patient not taking: Reported on 7/21/2025)       No current facility-administered medications for this visit.

## 2025-10-14 ENCOUNTER — APPOINTMENT (OUTPATIENT)
Dept: PRIMARY CARE | Facility: CLINIC | Age: 80
End: 2025-10-14
Payer: MEDICARE